# Patient Record
Sex: MALE | Race: WHITE | Employment: UNEMPLOYED | URBAN - METROPOLITAN AREA
[De-identification: names, ages, dates, MRNs, and addresses within clinical notes are randomized per-mention and may not be internally consistent; named-entity substitution may affect disease eponyms.]

---

## 2017-02-08 ENCOUNTER — TELEPHONE (OUTPATIENT)
Dept: FAMILY MEDICINE CLINIC | Age: 57
End: 2017-02-08

## 2017-02-16 LAB
ALBUMIN SERPL-MCNC: 4.1 G/DL (ref 3.2–5.3)
ALK PHOSPHATASE: 24 IU/L (ref 35–121)
ALT SERPL-CCNC: 21 IU/L (ref 5–59)
ANION GAP SERPL CALCULATED.3IONS-SCNC: 8 MMOL/L
AST SERPL-CCNC: 16 IU/L (ref 10–42)
BILIRUB SERPL-MCNC: 0.9 MG/DL (ref 0.2–1.3)
BUN BLDV-MCNC: 17 MG/DL (ref 10–20)
CALCIUM SERPL-MCNC: 9.5 MG/DL (ref 8.7–10.8)
CHLORIDE BLD-SCNC: 99 MMOL/L (ref 95–111)
CHOLESTEROL/HDL RATIO: 5
CHOLESTEROL: 171 MG/DL
CO2: 32 MMOL/L (ref 21–32)
CREAT SERPL-MCNC: 0.9 MG/DL (ref 0.5–1.3)
EGFR AFRICAN AMERICAN: 105
EGFR IF NONAFRICAN AMERICAN: 86
GLUCOSE: 151 MG/DL (ref 70–100)
HDLC SERPL-MCNC: 34 MG/DL (ref 40–60)
POTASSIUM SERPL-SCNC: 4.4 MMOL/L (ref 3.5–5.4)
SODIUM BLD-SCNC: 135 MMOL/L (ref 134–147)
TOTAL PROTEIN: 7 G/DL (ref 5.8–8)
TRIGL SERPL-MCNC: 405 MG/DL

## 2017-02-17 LAB
AVERAGE GLUCOSE: 163 MG/DL (ref 66–114)
CREATINE, URINE: 83.4 MG/DL
HBA1C MFR BLD: 7.3 % (ref 4.2–5.8)
MICROALBUMIN/CREAT 24H UR: 7 MG/DL
MICROALBUMIN/CREAT UR-RTO: 83 UG/MG
PSA, ULTRASENSITIVE: 0.36 NG/ML
TSH SERPL DL<=0.05 MIU/L-ACNC: 3.68 UIU/ML (ref 0.4–4.4)

## 2017-02-22 ENCOUNTER — OFFICE VISIT (OUTPATIENT)
Dept: FAMILY MEDICINE CLINIC | Age: 57
End: 2017-02-22

## 2017-02-22 VITALS
WEIGHT: 315 LBS | RESPIRATION RATE: 12 BRPM | BODY MASS INDEX: 42.66 KG/M2 | DIASTOLIC BLOOD PRESSURE: 86 MMHG | HEIGHT: 72 IN | SYSTOLIC BLOOD PRESSURE: 138 MMHG | OXYGEN SATURATION: 98 %

## 2017-02-22 DIAGNOSIS — E78.5 HYPERLIPIDEMIA, UNSPECIFIED HYPERLIPIDEMIA TYPE: ICD-10-CM

## 2017-02-22 DIAGNOSIS — K21.9 GASTROESOPHAGEAL REFLUX DISEASE, ESOPHAGITIS PRESENCE NOT SPECIFIED: Primary | ICD-10-CM

## 2017-02-22 DIAGNOSIS — I25.10 NON-OCCLUSIVE CORONARY ARTERY DISEASE: ICD-10-CM

## 2017-02-22 DIAGNOSIS — E66.01 MORBID OBESITY, UNSPECIFIED OBESITY TYPE (HCC): ICD-10-CM

## 2017-02-22 DIAGNOSIS — I10 ESSENTIAL HYPERTENSION: ICD-10-CM

## 2017-02-22 PROCEDURE — 99214 OFFICE O/P EST MOD 30 MIN: CPT | Performed by: FAMILY MEDICINE

## 2017-02-22 RX ORDER — CANAGLIFLOZIN 300 MG/1
TABLET, FILM COATED ORAL
Qty: 30 TABLET | Refills: 11 | Status: SHIPPED | OUTPATIENT
Start: 2017-02-22 | End: 2018-03-11 | Stop reason: SDUPTHER

## 2017-02-22 RX ORDER — PRAVASTATIN SODIUM 40 MG
TABLET ORAL
Qty: 90 TABLET | Refills: 3 | Status: SHIPPED | OUTPATIENT
Start: 2017-02-22 | End: 2017-08-24

## 2017-02-22 RX ORDER — VALSARTAN AND HYDROCHLOROTHIAZIDE 320; 12.5 MG/1; MG/1
TABLET, FILM COATED ORAL
Qty: 90 TABLET | Refills: 3 | Status: SHIPPED | OUTPATIENT
Start: 2017-02-22 | End: 2018-03-07 | Stop reason: SDUPTHER

## 2017-02-22 RX ORDER — GLIPIZIDE 10 MG/1
TABLET, FILM COATED, EXTENDED RELEASE ORAL
Qty: 90 TABLET | Refills: 3 | Status: SHIPPED | OUTPATIENT
Start: 2017-02-22 | End: 2018-02-28 | Stop reason: SDUPTHER

## 2017-02-22 RX ORDER — ASPIRIN 81 MG/1
81 TABLET ORAL DAILY
Qty: 90 TABLET | Refills: 3 | Status: SHIPPED | OUTPATIENT
Start: 2017-02-22

## 2017-02-22 RX ORDER — METOPROLOL TARTRATE 50 MG/1
50 TABLET, FILM COATED ORAL 2 TIMES DAILY
Qty: 180 TABLET | Refills: 3 | Status: SHIPPED | OUTPATIENT
Start: 2017-02-22 | End: 2017-07-20

## 2017-02-22 RX ORDER — AMLODIPINE BESYLATE 10 MG/1
TABLET ORAL
Qty: 90 TABLET | Refills: 3 | Status: SHIPPED | OUTPATIENT
Start: 2017-02-22 | End: 2018-02-11 | Stop reason: SDUPTHER

## 2017-02-22 ASSESSMENT — PATIENT HEALTH QUESTIONNAIRE - PHQ9
SUM OF ALL RESPONSES TO PHQ QUESTIONS 1-9: 0
1. LITTLE INTEREST OR PLEASURE IN DOING THINGS: 0
2. FEELING DOWN, DEPRESSED OR HOPELESS: 0
SUM OF ALL RESPONSES TO PHQ9 QUESTIONS 1 & 2: 0

## 2017-02-22 ASSESSMENT — ENCOUNTER SYMPTOMS
GASTROINTESTINAL NEGATIVE: 1
RESPIRATORY NEGATIVE: 1

## 2017-07-07 DIAGNOSIS — M25.561 ACUTE PAIN OF RIGHT KNEE: Primary | ICD-10-CM

## 2017-07-07 RX ORDER — KETOROLAC TROMETHAMINE 10 MG/1
10 TABLET, FILM COATED ORAL EVERY 6 HOURS PRN
Qty: 20 TABLET | Refills: 0 | Status: SHIPPED | OUTPATIENT
Start: 2017-07-07 | End: 2017-07-20 | Stop reason: ALTCHOICE

## 2017-07-20 ENCOUNTER — TELEPHONE (OUTPATIENT)
Dept: FAMILY MEDICINE CLINIC | Age: 57
End: 2017-07-20

## 2017-07-20 ENCOUNTER — OFFICE VISIT (OUTPATIENT)
Dept: FAMILY MEDICINE CLINIC | Age: 57
End: 2017-07-20
Payer: MEDICARE

## 2017-07-20 VITALS
HEIGHT: 71 IN | RESPIRATION RATE: 20 BRPM | DIASTOLIC BLOOD PRESSURE: 86 MMHG | SYSTOLIC BLOOD PRESSURE: 142 MMHG | HEART RATE: 88 BPM | OXYGEN SATURATION: 97 % | BODY MASS INDEX: 44.1 KG/M2 | WEIGHT: 315 LBS

## 2017-07-20 DIAGNOSIS — E78.5 HYPERLIPIDEMIA, UNSPECIFIED HYPERLIPIDEMIA TYPE: ICD-10-CM

## 2017-07-20 DIAGNOSIS — M25.461 KNEE EFFUSION, RIGHT: ICD-10-CM

## 2017-07-20 DIAGNOSIS — E66.01 MORBID OBESITY, UNSPECIFIED OBESITY TYPE (HCC): ICD-10-CM

## 2017-07-20 DIAGNOSIS — I10 ESSENTIAL HYPERTENSION: Primary | ICD-10-CM

## 2017-07-20 DIAGNOSIS — I25.10 CORONARY ARTERY DISEASE INVOLVING NATIVE CORONARY ARTERY OF NATIVE HEART WITHOUT ANGINA PECTORIS: ICD-10-CM

## 2017-07-20 PROCEDURE — 99214 OFFICE O/P EST MOD 30 MIN: CPT | Performed by: FAMILY MEDICINE

## 2017-07-20 RX ORDER — METOPROLOL TARTRATE 100 MG/1
100 TABLET ORAL 2 TIMES DAILY
Qty: 60 TABLET | Refills: 1 | Status: SHIPPED | OUTPATIENT
Start: 2017-07-20 | End: 2017-08-24 | Stop reason: SDUPTHER

## 2017-07-20 ASSESSMENT — ENCOUNTER SYMPTOMS
CHEST TIGHTNESS: 0
RESPIRATORY NEGATIVE: 1
GASTROINTESTINAL NEGATIVE: 1
SHORTNESS OF BREATH: 0

## 2017-07-24 ENCOUNTER — TELEPHONE (OUTPATIENT)
Dept: FAMILY MEDICINE CLINIC | Age: 57
End: 2017-07-24

## 2017-08-15 ENCOUNTER — TELEPHONE (OUTPATIENT)
Dept: FAMILY MEDICINE CLINIC | Age: 57
End: 2017-08-15

## 2017-08-17 LAB
AVERAGE GLUCOSE: 143 MG/DL (ref 66–114)
HBA1C MFR BLD: 6.6 % (ref 4.2–5.8)

## 2017-08-19 LAB — LDL CHOLESTEROL DIRECT: 149 MG/DL

## 2017-08-24 ENCOUNTER — OFFICE VISIT (OUTPATIENT)
Dept: FAMILY MEDICINE CLINIC | Age: 57
End: 2017-08-24
Payer: MEDICARE

## 2017-08-24 VITALS
RESPIRATION RATE: 14 BRPM | SYSTOLIC BLOOD PRESSURE: 130 MMHG | HEIGHT: 70 IN | DIASTOLIC BLOOD PRESSURE: 80 MMHG | OXYGEN SATURATION: 96 % | WEIGHT: 315 LBS | BODY MASS INDEX: 45.1 KG/M2 | HEART RATE: 70 BPM

## 2017-08-24 DIAGNOSIS — Z11.59 ENCOUNTER FOR HEPATITIS C SCREENING TEST FOR LOW RISK PATIENT: ICD-10-CM

## 2017-08-24 DIAGNOSIS — Z11.4 SCREENING FOR HIV (HUMAN IMMUNODEFICIENCY VIRUS): ICD-10-CM

## 2017-08-24 DIAGNOSIS — I25.10 NON-OCCLUSIVE CORONARY ARTERY DISEASE: ICD-10-CM

## 2017-08-24 DIAGNOSIS — Z12.5 SCREENING FOR PROSTATE CANCER: ICD-10-CM

## 2017-08-24 DIAGNOSIS — I10 ESSENTIAL HYPERTENSION: ICD-10-CM

## 2017-08-24 DIAGNOSIS — E66.01 MORBID OBESITY, UNSPECIFIED OBESITY TYPE (HCC): ICD-10-CM

## 2017-08-24 DIAGNOSIS — E11.9 CONTROLLED TYPE 2 DIABETES MELLITUS WITHOUT COMPLICATION, WITHOUT LONG-TERM CURRENT USE OF INSULIN (HCC): ICD-10-CM

## 2017-08-24 DIAGNOSIS — E78.5 HYPERLIPIDEMIA, UNSPECIFIED HYPERLIPIDEMIA TYPE: Primary | ICD-10-CM

## 2017-08-24 DIAGNOSIS — K21.9 GASTROESOPHAGEAL REFLUX DISEASE, ESOPHAGITIS PRESENCE NOT SPECIFIED: ICD-10-CM

## 2017-08-24 DIAGNOSIS — Z23 NEED FOR 23-POLYVALENT PNEUMOCOCCAL POLYSACCHARIDE VACCINE: ICD-10-CM

## 2017-08-24 PROCEDURE — 90732 PPSV23 VACC 2 YRS+ SUBQ/IM: CPT | Performed by: FAMILY MEDICINE

## 2017-08-24 PROCEDURE — 90471 IMMUNIZATION ADMIN: CPT | Performed by: FAMILY MEDICINE

## 2017-08-24 PROCEDURE — 99214 OFFICE O/P EST MOD 30 MIN: CPT | Performed by: FAMILY MEDICINE

## 2017-08-24 RX ORDER — PRAVASTATIN SODIUM 80 MG/1
80 TABLET ORAL DAILY
Qty: 90 TABLET | Refills: 3 | Status: SHIPPED | OUTPATIENT
Start: 2017-08-24 | End: 2018-07-25 | Stop reason: SDUPTHER

## 2017-08-24 RX ORDER — METOPROLOL TARTRATE 100 MG/1
100 TABLET ORAL 2 TIMES DAILY
Qty: 60 TABLET | Refills: 11 | Status: SHIPPED | OUTPATIENT
Start: 2017-08-24 | End: 2018-07-26 | Stop reason: ALTCHOICE

## 2017-08-24 ASSESSMENT — ENCOUNTER SYMPTOMS
RESPIRATORY NEGATIVE: 1
GASTROINTESTINAL NEGATIVE: 1

## 2017-09-07 ENCOUNTER — OFFICE VISIT (OUTPATIENT)
Dept: CARDIOLOGY CLINIC | Age: 57
End: 2017-09-07
Payer: MEDICARE

## 2017-09-07 VITALS
HEIGHT: 72 IN | BODY MASS INDEX: 42.66 KG/M2 | DIASTOLIC BLOOD PRESSURE: 80 MMHG | HEART RATE: 75 BPM | WEIGHT: 315 LBS | SYSTOLIC BLOOD PRESSURE: 132 MMHG

## 2017-09-07 DIAGNOSIS — E11.8 TYPE 2 DIABETES MELLITUS WITH COMPLICATION, UNSPECIFIED LONG TERM INSULIN USE STATUS: ICD-10-CM

## 2017-09-07 DIAGNOSIS — Z91.89 CARDIOVASCULAR RISK FACTOR: ICD-10-CM

## 2017-09-07 DIAGNOSIS — R00.2 HEART PALPITATIONS: ICD-10-CM

## 2017-09-07 DIAGNOSIS — E78.5 DYSLIPIDEMIA, GOAL LDL BELOW 100: ICD-10-CM

## 2017-09-07 DIAGNOSIS — R94.31 ABNORMAL ELECTROCARDIOGRAM (ECG) (EKG): Primary | ICD-10-CM

## 2017-09-07 DIAGNOSIS — I10 HYPERTENSION GOAL BP (BLOOD PRESSURE) < 130/80: ICD-10-CM

## 2017-09-07 PROCEDURE — 99203 OFFICE O/P NEW LOW 30 MIN: CPT | Performed by: INTERNAL MEDICINE

## 2017-09-07 PROCEDURE — 93000 ELECTROCARDIOGRAM COMPLETE: CPT | Performed by: INTERNAL MEDICINE

## 2017-09-07 RX ORDER — NITROGLYCERIN 0.4 MG/1
0.4 TABLET SUBLINGUAL EVERY 5 MIN PRN
COMMUNITY
End: 2019-06-27 | Stop reason: ALTCHOICE

## 2017-09-21 ENCOUNTER — HOSPITAL ENCOUNTER (OUTPATIENT)
Dept: NON INVASIVE DIAGNOSTICS | Age: 57
Discharge: HOME | End: 2017-09-21
Payer: MEDICARE

## 2017-09-21 DIAGNOSIS — R94.31 ABNORMAL ELECTROCARDIOGRAM (ECG) (EKG): ICD-10-CM

## 2017-09-21 LAB
LV EF: 48 %
LVEF MODALITY: NORMAL

## 2017-09-21 PROCEDURE — 93017 CV STRESS TEST TRACING ONLY: CPT | Performed by: INTERNAL MEDICINE

## 2017-09-21 PROCEDURE — 78452 HT MUSCLE IMAGE SPECT MULT: CPT

## 2017-09-21 PROCEDURE — 6360000002 HC RX W HCPCS

## 2017-09-21 PROCEDURE — 93306 TTE W/DOPPLER COMPLETE: CPT

## 2017-09-21 PROCEDURE — 3430000000 HC RX DIAGNOSTIC RADIOPHARMACEUTICAL: Performed by: INTERNAL MEDICINE

## 2017-09-21 PROCEDURE — A9500 TC99M SESTAMIBI: HCPCS | Performed by: INTERNAL MEDICINE

## 2017-09-21 RX ADMIN — Medication 9.4 MILLICURIE: at 08:35

## 2017-09-21 RX ADMIN — Medication 31.6 MILLICURIE: at 09:40

## 2017-09-28 ENCOUNTER — OFFICE VISIT (OUTPATIENT)
Dept: CARDIOLOGY CLINIC | Age: 57
End: 2017-09-28
Payer: MEDICARE

## 2017-09-28 VITALS
WEIGHT: 315 LBS | HEIGHT: 72 IN | HEART RATE: 68 BPM | DIASTOLIC BLOOD PRESSURE: 80 MMHG | BODY MASS INDEX: 42.66 KG/M2 | SYSTOLIC BLOOD PRESSURE: 130 MMHG

## 2017-09-28 DIAGNOSIS — E11.8 TYPE 2 DIABETES MELLITUS WITH COMPLICATION, UNSPECIFIED LONG TERM INSULIN USE STATUS: ICD-10-CM

## 2017-09-28 DIAGNOSIS — E78.5 DYSLIPIDEMIA, GOAL LDL BELOW 100: ICD-10-CM

## 2017-09-28 DIAGNOSIS — I10 HYPERTENSION GOAL BP (BLOOD PRESSURE) < 130/80: Primary | ICD-10-CM

## 2017-09-28 PROCEDURE — 99213 OFFICE O/P EST LOW 20 MIN: CPT | Performed by: INTERNAL MEDICINE

## 2017-10-03 ENCOUNTER — TELEPHONE (OUTPATIENT)
Dept: CARDIOLOGY CLINIC | Age: 57
End: 2017-10-03

## 2017-10-20 RX ORDER — GLIPIZIDE 10 MG/1
TABLET, FILM COATED, EXTENDED RELEASE ORAL
Qty: 90 TABLET | Refills: 3 | Status: SHIPPED | OUTPATIENT
Start: 2017-10-20 | End: 2018-08-29 | Stop reason: SDUPTHER

## 2017-10-25 ENCOUNTER — TELEPHONE (OUTPATIENT)
Dept: FAMILY MEDICINE CLINIC | Age: 57
End: 2017-10-25

## 2018-02-11 DIAGNOSIS — I10 ESSENTIAL HYPERTENSION: ICD-10-CM

## 2018-02-12 ENCOUNTER — TELEPHONE (OUTPATIENT)
Dept: FAMILY MEDICINE CLINIC | Age: 58
End: 2018-02-12

## 2018-02-12 RX ORDER — AMLODIPINE BESYLATE 10 MG/1
TABLET ORAL
Qty: 90 TABLET | Refills: 3 | Status: SHIPPED | OUTPATIENT
Start: 2018-02-12 | End: 2019-01-25 | Stop reason: SDUPTHER

## 2018-02-16 LAB
ALBUMIN SERPL-MCNC: 4.6 G/DL (ref 3.5–5.2)
ALK PHOSPHATASE: 30 U/L (ref 39–118)
ALT SERPL-CCNC: 21 U/L (ref 5–50)
ANION GAP SERPL CALCULATED.3IONS-SCNC: 15 MEQ/L (ref 10–19)
AST SERPL-CCNC: 15 U/L (ref 9–50)
AVERAGE GLUCOSE: 169 MG/DL (ref 66–114)
BILIRUB SERPL-MCNC: 0.8 MG/DL
BUN BLDV-MCNC: 19 MG/DL (ref 8–23)
CALCIUM SERPL-MCNC: 10 MG/DL (ref 8.5–10.5)
CHLORIDE BLD-SCNC: 95 MEQ/L (ref 95–107)
CHOLESTEROL/HDL RATIO: 4.3
CHOLESTEROL: 159 MG/DL
CO2: 28 MEQ/L (ref 19–31)
CREAT SERPL-MCNC: 0.9 MG/DL (ref 0.8–1.4)
CREATINE, URINE: 70.2 MG/DL (ref 39–259)
EGFR AFRICAN AMERICAN: 107.2 ML/MIN/1.73 M2
EGFR IF NONAFRICAN AMERICAN: 92.5 ML/MIN/1.73 M2
GLUCOSE: 177 MG/DL (ref 70–99)
HBA1C MFR BLD: 7.5 % (ref 4.2–5.8)
HDLC SERPL-MCNC: 37 MG/DL
HEPATITIS C ANTIBODY: NEGATIVE
HIV 1,2 COMBO ANTIGEN/ANTIBODY: NORMAL
LDL CHOLESTEROL CALCULATED: 66 MG/DL
LDL/HDL RATIO: 1.8
MICROALBUMIN/CREAT 24H UR: <12 MG/DL
MICROALBUMIN/CREAT UR-RTO: NORMAL MG/G
POTASSIUM SERPL-SCNC: 4.8 MEQ/L (ref 3.5–5.4)
PSA, ULTRASENSITIVE: 0.37 NG/ML
SODIUM BLD-SCNC: 138 MEQ/L (ref 135–146)
TOTAL PROTEIN: 7.9 G/DL (ref 6.1–8.3)
TRIGL SERPL-MCNC: 281 MG/DL
VLDLC SERPL CALC-MCNC: 56 MG/DL

## 2018-02-28 ENCOUNTER — OFFICE VISIT (OUTPATIENT)
Dept: FAMILY MEDICINE CLINIC | Age: 58
End: 2018-02-28
Payer: MEDICARE

## 2018-02-28 VITALS
HEIGHT: 72 IN | DIASTOLIC BLOOD PRESSURE: 84 MMHG | SYSTOLIC BLOOD PRESSURE: 138 MMHG | BODY MASS INDEX: 42.66 KG/M2 | RESPIRATION RATE: 16 BRPM | HEART RATE: 84 BPM | WEIGHT: 315 LBS

## 2018-02-28 DIAGNOSIS — I10 ESSENTIAL HYPERTENSION: ICD-10-CM

## 2018-02-28 DIAGNOSIS — I25.10 NON-OCCLUSIVE CORONARY ARTERY DISEASE: ICD-10-CM

## 2018-02-28 DIAGNOSIS — E66.01 MORBID OBESITY (HCC): ICD-10-CM

## 2018-02-28 DIAGNOSIS — E78.49 OTHER HYPERLIPIDEMIA: ICD-10-CM

## 2018-02-28 DIAGNOSIS — E11.49 OTHER DIABETIC NEUROLOGICAL COMPLICATION ASSOCIATED WITH TYPE 2 DIABETES MELLITUS (HCC): ICD-10-CM

## 2018-02-28 DIAGNOSIS — K21.9 GASTROESOPHAGEAL REFLUX DISEASE, ESOPHAGITIS PRESENCE NOT SPECIFIED: ICD-10-CM

## 2018-02-28 PROBLEM — E11.40 DIABETIC NEUROPATHY (HCC): Status: ACTIVE | Noted: 2018-02-28

## 2018-02-28 PROCEDURE — 3017F COLORECTAL CA SCREEN DOC REV: CPT | Performed by: FAMILY MEDICINE

## 2018-02-28 PROCEDURE — G8484 FLU IMMUNIZE NO ADMIN: HCPCS | Performed by: FAMILY MEDICINE

## 2018-02-28 PROCEDURE — 99214 OFFICE O/P EST MOD 30 MIN: CPT | Performed by: FAMILY MEDICINE

## 2018-02-28 PROCEDURE — 3045F PR MOST RECENT HEMOGLOBIN A1C LEVEL 7.0-9.0%: CPT | Performed by: FAMILY MEDICINE

## 2018-02-28 PROCEDURE — 1036F TOBACCO NON-USER: CPT | Performed by: FAMILY MEDICINE

## 2018-02-28 PROCEDURE — G8598 ASA/ANTIPLAT THER USED: HCPCS | Performed by: FAMILY MEDICINE

## 2018-02-28 PROCEDURE — G8417 CALC BMI ABV UP PARAM F/U: HCPCS | Performed by: FAMILY MEDICINE

## 2018-02-28 PROCEDURE — G8427 DOCREV CUR MEDS BY ELIG CLIN: HCPCS | Performed by: FAMILY MEDICINE

## 2018-02-28 RX ORDER — DULOXETIN HYDROCHLORIDE 30 MG/1
30 CAPSULE, DELAYED RELEASE ORAL DAILY
Qty: 90 CAPSULE | Refills: 3 | Status: SHIPPED | OUTPATIENT
Start: 2018-02-28 | End: 2018-07-26 | Stop reason: ALTCHOICE

## 2018-02-28 ASSESSMENT — ENCOUNTER SYMPTOMS
GASTROINTESTINAL NEGATIVE: 1
RESPIRATORY NEGATIVE: 1

## 2018-02-28 ASSESSMENT — PATIENT HEALTH QUESTIONNAIRE - PHQ9
SUM OF ALL RESPONSES TO PHQ QUESTIONS 1-9: 0
SUM OF ALL RESPONSES TO PHQ9 QUESTIONS 1 & 2: 0
1. LITTLE INTEREST OR PLEASURE IN DOING THINGS: 0
2. FEELING DOWN, DEPRESSED OR HOPELESS: 0

## 2018-02-28 NOTE — PROGRESS NOTES
02/15/2018 0848    ALKPHOS 30 (L) 02/15/2018 0848    AST 15 02/15/2018 0848    ALT 21 02/15/2018 0848    BILITOT 0.8 02/15/2018 0848            Lab Results   Component Value Date    TSH 3.680 02/16/2017       Lab Results   Component Value Date    WBC 8.3 10/23/2012    HGB 15.8 10/23/2012    HCT 46.3 10/23/2012    MCV 93.5 10/23/2012     10/23/2012         Health Maintenance   Topic Date Due    Shingles Vaccine (1 of 2 - 2 Dose Series) 01/02/2008    Colon cancer screen colonoscopy  01/02/2008    Diabetic retinal exam  03/18/2016    Flu vaccine (1) 09/01/2017    Diabetic foot exam  07/20/2018    A1C test (Diabetic or Prediabetic)  02/15/2019    Diabetic microalbuminuria test  02/15/2019    Lipid screen  02/15/2019    DTaP/Tdap/Td vaccine (2 - Tdap) 03/18/2025    Pneumococcal med risk  Completed    Hepatitis C screen  Completed    HIV screen  Completed       Immunization History   Administered Date(s) Administered    DTaP 03/18/2015    Pneumococcal Polysaccharide ([id number]) 08/24/2017    Tdap (Boostrix, Adacel) 03/18/2015         Review of Systems   Constitutional: Negative. HENT: Negative. Respiratory: Negative. Cardiovascular: Negative. Gastrointestinal: Negative. Musculoskeletal: Negative. Neurological: Positive for numbness (burninng both feet). All other systems reviewed and are negative. Objective:   Physical Exam   Constitutional: He is oriented to person, place, and time. He appears well-developed and well-nourished. HENT:   Head: Normocephalic and atraumatic. Right Ear: Tympanic membrane normal.   Left Ear: Tympanic membrane normal.   Mouth/Throat: Oropharynx is clear and moist and mucous membranes are normal.   Neck: Carotid bruit is not present. Cardiovascular: Normal rate, regular rhythm and normal heart sounds. No murmur heard. Pulmonary/Chest: Effort normal and breath sounds normal.   Abdominal: Soft.  Bowel sounds are normal.   Musculoskeletal:

## 2018-03-07 ENCOUNTER — TELEPHONE (OUTPATIENT)
Dept: FAMILY MEDICINE CLINIC | Age: 58
End: 2018-03-07

## 2018-03-07 DIAGNOSIS — I10 ESSENTIAL HYPERTENSION: ICD-10-CM

## 2018-03-07 RX ORDER — NYSTATIN 100000 U/G
CREAM TOPICAL
Qty: 30 G | Refills: 1 | Status: SHIPPED | OUTPATIENT
Start: 2018-03-07 | End: 2018-10-10 | Stop reason: SDUPTHER

## 2018-03-07 RX ORDER — VALSARTAN AND HYDROCHLOROTHIAZIDE 320; 12.5 MG/1; MG/1
TABLET, FILM COATED ORAL
Qty: 90 TABLET | Refills: 3 | Status: SHIPPED | OUTPATIENT
Start: 2018-03-07 | End: 2019-05-02 | Stop reason: SDUPTHER

## 2018-03-07 RX ORDER — METOPROLOL TARTRATE 50 MG/1
TABLET, FILM COATED ORAL
Qty: 180 TABLET | Refills: 3 | Status: SHIPPED | OUTPATIENT
Start: 2018-03-07 | End: 2019-02-04 | Stop reason: SDUPTHER

## 2018-03-07 NOTE — TELEPHONE ENCOUNTER
Patient's wife Sushant Lee (on [employer]IP[employer]) left message on our vm asking for refill of nystatin. Said patient is having same issues as before and is out of medication. Will check [facility] on [address] if no call back.

## 2018-03-12 RX ORDER — CANAGLIFLOZIN 300 MG/1
TABLET, FILM COATED ORAL
Qty: 30 TABLET | Refills: 11 | Status: SHIPPED | OUTPATIENT
Start: 2018-03-12 | End: 2019-03-05 | Stop reason: SDUPTHER

## 2018-07-25 DIAGNOSIS — E78.5 HYPERLIPIDEMIA, UNSPECIFIED HYPERLIPIDEMIA TYPE: ICD-10-CM

## 2018-07-25 RX ORDER — PRAVASTATIN SODIUM 80 MG/1
TABLET ORAL
Qty: 90 TABLET | Refills: 3 | Status: SHIPPED | OUTPATIENT
Start: 2018-07-25 | End: 2019-05-29 | Stop reason: SDUPTHER

## 2018-07-26 ENCOUNTER — OFFICE VISIT (OUTPATIENT)
Dept: CARDIOLOGY CLINIC | Age: 58
End: 2018-07-26
Payer: MEDICARE

## 2018-07-26 VITALS
HEIGHT: 72 IN | WEIGHT: 315 LBS | HEART RATE: 72 BPM | BODY MASS INDEX: 42.66 KG/M2 | SYSTOLIC BLOOD PRESSURE: 126 MMHG | DIASTOLIC BLOOD PRESSURE: 78 MMHG

## 2018-07-26 DIAGNOSIS — E78.5 DYSLIPIDEMIA, GOAL LDL BELOW 100: ICD-10-CM

## 2018-07-26 DIAGNOSIS — I10 HYPERTENSION GOAL BP (BLOOD PRESSURE) < 130/80: Primary | ICD-10-CM

## 2018-07-26 DIAGNOSIS — Z91.89 CARDIOVASCULAR RISK FACTOR: ICD-10-CM

## 2018-07-26 PROCEDURE — G8598 ASA/ANTIPLAT THER USED: HCPCS | Performed by: INTERNAL MEDICINE

## 2018-07-26 PROCEDURE — 1036F TOBACCO NON-USER: CPT | Performed by: INTERNAL MEDICINE

## 2018-07-26 PROCEDURE — 3017F COLORECTAL CA SCREEN DOC REV: CPT | Performed by: INTERNAL MEDICINE

## 2018-07-26 PROCEDURE — G8417 CALC BMI ABV UP PARAM F/U: HCPCS | Performed by: INTERNAL MEDICINE

## 2018-07-26 PROCEDURE — 99212 OFFICE O/P EST SF 10 MIN: CPT | Performed by: INTERNAL MEDICINE

## 2018-07-26 PROCEDURE — G8427 DOCREV CUR MEDS BY ELIG CLIN: HCPCS | Performed by: INTERNAL MEDICINE

## 2018-07-26 NOTE — PROGRESS NOTES
This patient is followed regularly by Dr. Alyson Urrutia DO. Chief Complaint   Patient presents with    Check-Up     9 month follow-up    Hypertension     Denies chest pain and shortness of breath. Current Outpatient Prescriptions   Medication Sig Dispense Refill    pravastatin (PRAVACHOL) 80 MG tablet take 1 tablet by mouth once daily 90 tablet 3    INVOKANA 300 MG TABS tablet take 1 tablet by mouth every morning 30 tablet 11    metoprolol tartrate (LOPRESSOR) 50 MG tablet take 1 tablet by mouth twice a day 180 tablet 3    valsartan-hydrochlorothiazide (DIOVAN-HCT) 320-12.5 MG per tablet take 1 tablet by mouth once daily 90 tablet 3    nystatin (MYCOSTATIN) 426329 UNIT/GM cream Apply topically 2 times daily. 30 g 1    amLODIPine (NORVASC) 10 MG tablet take 1 tablet by mouth once daily 90 tablet 3    metFORMIN (GLUCOPHAGE) 1000 MG tablet take 1 tablet by mouth twice a day with meals 180 tablet 3    glipiZIDE (GLUCOTROL XL) 10 MG extended release tablet take 1 tablet by mouth once daily 90 tablet 3    nitroGLYCERIN (NITROSTAT) 0.4 MG SL tablet Place 0.4 mg under the tongue every 5 minutes as needed for Chest pain up to max of 3 total doses. If no relief after 1 dose, call 911.  VENTOLIN  (90 Base) MCG/ACT inhaler Inhale 2 puffs into the lungs every 4 hours as needed for Wheezing or Shortness of Breath 1 Inhaler 0    aspirin 81 MG EC tablet Take 1 tablet by mouth daily 90 tablet 3    FISH OIL Take  by mouth daily.  Multiple Vitamins-Minerals (CENTRUM PO) Take 1 tablet by mouth daily. No current facility-administered medications for this visit. Review of Systems - General ROS: negative  Psychological ROS: negative  Hematological and Lymphatic ROS: No history of blood clots or bleeding disorder.    Respiratory ROS: no cough, shortness of breath, or wheezing  Cardiovascular ROS: no chest pain or dyspnea on exertion, no palpitations  Gastrointestinal ROS: family doctor. The therapeutic values that need to be met are also presented to the patient and need to achieve them is emphasized and patient agrees and accepts. RTC 1 year.

## 2018-07-26 NOTE — PROGRESS NOTES
9 month follow-up. He denies having any chest pain, dizziness, lightheadedness or FLOR. He has SOB with exertion.

## 2018-08-20 ENCOUNTER — TELEPHONE (OUTPATIENT)
Dept: FAMILY MEDICINE CLINIC | Age: 58
End: 2018-08-20

## 2018-08-21 DIAGNOSIS — I10 ESSENTIAL HYPERTENSION: ICD-10-CM

## 2018-08-21 RX ORDER — METOPROLOL TARTRATE 100 MG/1
TABLET ORAL
Qty: 60 TABLET | Refills: 11 | Status: SHIPPED | OUTPATIENT
Start: 2018-08-21 | End: 2018-12-28 | Stop reason: DRUGHIGH

## 2018-08-23 LAB
AVERAGE GLUCOSE: 186 MG/DL (ref 66–114)
HBA1C MFR BLD: 8.1 % (ref 4.2–5.8)

## 2018-08-29 ENCOUNTER — OFFICE VISIT (OUTPATIENT)
Dept: FAMILY MEDICINE CLINIC | Age: 58
End: 2018-08-29
Payer: MEDICARE

## 2018-08-29 ENCOUNTER — TELEPHONE (OUTPATIENT)
Dept: FAMILY MEDICINE CLINIC | Age: 58
End: 2018-08-29

## 2018-08-29 VITALS
HEIGHT: 72 IN | RESPIRATION RATE: 20 BRPM | DIASTOLIC BLOOD PRESSURE: 76 MMHG | SYSTOLIC BLOOD PRESSURE: 136 MMHG | WEIGHT: 315 LBS | HEART RATE: 80 BPM | BODY MASS INDEX: 42.66 KG/M2

## 2018-08-29 DIAGNOSIS — I10 ESSENTIAL HYPERTENSION: ICD-10-CM

## 2018-08-29 DIAGNOSIS — I87.2 VENOUS STASIS DERMATITIS OF LEFT LOWER EXTREMITY: Primary | ICD-10-CM

## 2018-08-29 DIAGNOSIS — R60.0 LOWER LEG EDEMA: ICD-10-CM

## 2018-08-29 DIAGNOSIS — K21.9 GASTROESOPHAGEAL REFLUX DISEASE, ESOPHAGITIS PRESENCE NOT SPECIFIED: ICD-10-CM

## 2018-08-29 DIAGNOSIS — F51.01 PRIMARY INSOMNIA: ICD-10-CM

## 2018-08-29 DIAGNOSIS — E66.01 MORBID OBESITY (HCC): ICD-10-CM

## 2018-08-29 DIAGNOSIS — E78.49 OTHER HYPERLIPIDEMIA: ICD-10-CM

## 2018-08-29 PROCEDURE — G8427 DOCREV CUR MEDS BY ELIG CLIN: HCPCS | Performed by: FAMILY MEDICINE

## 2018-08-29 PROCEDURE — 3045F PR MOST RECENT HEMOGLOBIN A1C LEVEL 7.0-9.0%: CPT | Performed by: FAMILY MEDICINE

## 2018-08-29 PROCEDURE — 2022F DILAT RTA XM EVC RTNOPTHY: CPT | Performed by: FAMILY MEDICINE

## 2018-08-29 PROCEDURE — G8417 CALC BMI ABV UP PARAM F/U: HCPCS | Performed by: FAMILY MEDICINE

## 2018-08-29 PROCEDURE — 99214 OFFICE O/P EST MOD 30 MIN: CPT | Performed by: FAMILY MEDICINE

## 2018-08-29 PROCEDURE — 3017F COLORECTAL CA SCREEN DOC REV: CPT | Performed by: FAMILY MEDICINE

## 2018-08-29 PROCEDURE — 1036F TOBACCO NON-USER: CPT | Performed by: FAMILY MEDICINE

## 2018-08-29 PROCEDURE — G8598 ASA/ANTIPLAT THER USED: HCPCS | Performed by: FAMILY MEDICINE

## 2018-08-29 RX ORDER — GLIPIZIDE 10 MG/1
TABLET, FILM COATED, EXTENDED RELEASE ORAL
Qty: 90 TABLET | Refills: 3 | Status: SHIPPED | OUTPATIENT
Start: 2018-08-29 | End: 2019-07-31 | Stop reason: SDUPTHER

## 2018-08-29 RX ORDER — DULOXETIN HYDROCHLORIDE 30 MG/1
30 CAPSULE, DELAYED RELEASE ORAL DAILY
COMMUNITY
End: 2019-12-16 | Stop reason: SDUPTHER

## 2018-08-29 ASSESSMENT — ENCOUNTER SYMPTOMS
GASTROINTESTINAL NEGATIVE: 1
RESPIRATORY NEGATIVE: 1

## 2018-08-29 NOTE — PROGRESS NOTES
Subjective:      Patient ID: Raciel Abel is a 59 y.o. male. HPI:    Chief Complaint   Patient presents with    6 Month Follow-Up    Diabetes    Hypertension     ? which does of Metoprolol he is to be taking    Results    Rash     left leg    Hand Problem     left hand sore keeps opening up    Insomnia     6 month eval.      BP well controlled. BP Readings from Last 3 Encounters:   08/29/18 136/76   07/26/18 126/78   02/28/18 138/84     Sugars not well controlled. Continues to climb, 6.6 to 7.5 to 8.1. Lab Results   Component Value Date    LABA1C 8.1 (H) 08/22/2018     Weight stable. Wt Readings from Last 3 Encounters:   08/29/18 (!) 324 lb 8 oz (147.2 kg)   07/26/18 (!) 325 lb (147.4 kg)   02/28/18 (!) 324 lb (147 kg)     Pt c/o rash to left leg for 2 weeks. Very itchy. No swelling. Only on the left lower leg. Has been using steroids with no relief. Not sleeping well, this is not new for him. De does snore. Pt also c/o left hand soreness and swelling. Picks at them. Patient Active Problem List   Diagnosis    HTN (hypertension)    Hyperlipidemia    GERD (gastroesophageal reflux disease)    ED (erectile dysfunction)    Morbid obesity ([address]    Non-occlusive coronary artery disease    Diabetes mellitus type II, controlled ([address]    Uncontrolled diabetes mellitus type 2 without complications ([address]    Controlled type 2 diabetes mellitus without complication, without long-term current use of insulin ([address]    Diabetic neuropathy ([address]     Past Surgical History:   Procedure Laterality Date    CARDIAC CATHETERIZATION      KNEE SURGERY Right 12/2017    VARICOSE VEIN SURGERY       Prior to Admission medications    Medication Sig Start Date End Date Taking?  Authorizing Provider   DULoxetine (CYMBALTA) 30 MG extended release capsule Take 30 mg by mouth daily   Yes Historical Provider, MD   pravastatin (PRAVACHOL) 80 MG tablet take 1 tablet by mouth once daily 7/25/18  Yes Orvel Hikes, DO   INVOKANA 300 MG TABS tablet take 1 tablet by mouth every morning 3/12/18  Yes Orvel Hikes, DO   metoprolol tartrate (LOPRESSOR) 50 MG tablet take 1 tablet by mouth twice a day 3/7/18  Yes Orvel Hikes, DO   valsartan-hydrochlorothiazide (DIOVAN-HCT) 320-12.5 MG per tablet take 1 tablet by mouth once daily 3/7/18  Yes Orvel Hikes, DO   amLODIPine (NORVASC) 10 MG tablet take 1 tablet by mouth once daily 2/12/18  Yes Orvel Hikes, DO   metFORMIN (GLUCOPHAGE) 1000 MG tablet take 1 tablet by mouth twice a day with meals 12/28/17  Yes Orvel Hikes, DO   glipiZIDE (GLUCOTROL XL) 10 MG extended release tablet take 1 tablet by mouth once daily 10/20/17  Yes Orvel Hikes, DO   VENTOLIN  (46 Base) MCG/ACT inhaler Inhale 2 puffs into the lungs every 4 hours as needed for Wheezing or Shortness of Breath 8/24/17  Yes Orvel Hikes, DO   aspirin 81 MG EC tablet Take 1 tablet by mouth daily 2/22/17  Yes Orvel Hikes, DO   FISH OIL Take  by mouth daily. Yes Historical Provider, MD   Multiple Vitamins-Minerals (CENTRUM PO) Take 1 tablet by mouth daily. Yes Historical Provider, MD   metoprolol (LOPRESSOR) 100 MG tablet take 1 tablet by mouth twice a day 8/21/18   Orvel Hikes, DO   nystatin (MYCOSTATIN) 846255 UNIT/GM cream Apply topically 2 times daily. 3/7/18   Orvel Hikes, DO   nitroGLYCERIN (NITROSTAT) 0.4 MG SL tablet Place 0.4 mg under the tongue every 5 minutes as needed for Chest pain up to max of 3 total doses. If no relief after 1 dose, call 911.     Historical Provider, MD       Lab Results   Component Value Date    LABA1C 8.1 (H) 08/22/2018     No results found for: EAG    No components found for: CHLPL  Lab Results   Component Value Date    TRIG 281 (H) 02/15/2018    TRIG 405 (H) 02/16/2017    TRIG 298 (H) 06/25/2015     Lab Results   Component Value Date    HDL 37 (L) 02/15/2018    HDL 34 (L) 02/16/2017    HDL 31 systems reviewed and are negative. Objective:   Physical Exam   Constitutional: He is oriented to person, place, and time. He appears well-developed and well-nourished. HENT:   Head: Normocephalic and atraumatic. Right Ear: Tympanic membrane normal.   Left Ear: Tympanic membrane normal.   Mouth/Throat: Oropharynx is clear and moist and mucous membranes are normal.   Neck: Carotid bruit is not present. Cardiovascular: Normal rate, regular rhythm and normal heart sounds. No murmur heard. Pulmonary/Chest: Effort normal and breath sounds normal.   Abdominal: Soft. Bowel sounds are normal.   Musculoskeletal: He exhibits no edema. Neurological: He is alert and oriented to person, place, and time. Skin: Skin is warm and dry. Psychiatric: He has a normal mood and affect. His behavior is normal.   Nursing note and vitals reviewed. Assessment:       Diagnosis Orders   1. Venous stasis dermatitis of left lower extremity     2. Lower leg edema     3. Primary insomnia     4. Uncontrolled type 2 diabetes mellitus without complication, without long-term current use of insulin ([facility])  glipiZIDE (GLUCOTROL XL) 10 MG extended release tablet     DIABETES FOOT EXAM    Dulaglutide (TRULICITY) 7.19 NC/5.3CD SOPN    Insulin Pen Needle 31G X 5 MM MISC    Hemoglobin A1C   5. Morbid obesity ([address]     6. Essential hypertension     7. Other hyperlipidemia     8.  Gastroesophageal reflux disease, esophagitis presence not specified             Plan:      -  Chronic medical problems stable  -  Continue current medications  -  Follow up with specialists as scheduled  -  A1C continues to climb, must change diet and lose weight   -  Will add Trulicity weekly  -  Recheck A1C 3 mos  -  RTO 3 mos        Cuauhtemoc Evans DO

## 2018-08-29 NOTE — PATIENT INSTRUCTIONS
You may receive a survey about your visit with us today. The feedback from our patients helps us identify what is working well and where the service to all patients can be enhanced. Thank you! Patient Education        Learning About Diabetes Food Guidelines  Your Care Instructions    Meal planning is important to manage diabetes. It helps keep your blood sugar at a target level (which you set with your doctor). You don't have to eat special foods. You can eat what your family eats, including sweets once in a while. But you do have to pay attention to how often you eat and how much you eat of certain foods. You may want to work with a dietitian or a certified diabetes educator (CDE) to help you plan meals and snacks. A dietitian or CDE can also help you lose weight if that is one of your goals. What should you know about eating carbs? Managing the amount of carbohydrate (carbs) you eat is an important part of healthy meals when you have diabetes. Carbohydrate is found in many foods. · Learn which foods have carbs. And learn the amounts of carbs in different foods. ¨ Bread, cereal, pasta, and rice have about 15 grams of carbs in a serving. A serving is 1 slice of bread (1 ounce), ½ cup of cooked cereal, or 1/3 cup of cooked pasta or rice. ¨ Fruits have 15 grams of carbs in a serving. A serving is 1 small fresh fruit, such as an apple or orange; ½ of a banana; ½ cup of cooked or canned fruit; ½ cup of fruit juice; 1 cup of melon or raspberries; or 2 tablespoons of dried fruit. ¨ Milk and no-sugar-added yogurt have 15 grams of carbs in a serving. A serving is 1 cup of milk or 2/3 cup of no-sugar-added yogurt. ¨ Starchy vegetables have 15 grams of carbs in a serving. A serving is ½ cup of mashed potatoes or sweet potato; 1 cup winter squash; ½ of a small baked potato; ½ cup of cooked beans; or ½ cup cooked corn or green peas.   · Learn how much carbs to eat each day and at each meal. A dietitian or CDE can teach you how to keep track of the amount of carbs you eat. This is called carbohydrate counting. · If you are not sure how to count carbohydrate grams, use the Plate Method to plan meals. It is a good, quick way to make sure that you have a balanced meal. It also helps you spread carbs throughout the day. ¨ Divide your plate by types of foods. Put non-starchy vegetables on half the plate, meat or other protein food on one-quarter of the plate, and a grain or starchy vegetable in the final quarter of the plate. To this you can add a small piece of fruit and 1 cup of milk or yogurt, depending on how many carbs you are supposed to eat at a meal.  · Try to eat about the same amount of carbs at each meal. Do not \"save up\" your daily allowance of carbs to eat at one meal.  · Proteins have very little or no carbs per serving. Examples of proteins are beef, chicken, turkey, fish, eggs, tofu, cheese, cottage cheese, and peanut butter. A serving size of meat is 3 ounces, which is about the size of a deck of cards. Examples of meat substitute serving sizes (equal to 1 ounce of meat) are 1/4 cup of cottage cheese, 1 egg, 1 tablespoon of peanut butter, and ½ cup of tofu. How can you eat out and still eat healthy? · Learn to estimate the serving sizes of foods that have carbohydrate. If you measure food at home, it will be easier to estimate the amount in a serving of restaurant food. · If the meal you order has too much carbohydrate (such as potatoes, corn, or baked beans), ask to have a low-carbohydrate food instead. Ask for a salad or green vegetables. · If you use insulin, check your blood sugar before and after eating out to help you plan how much to eat in the future. · If you eat more carbohydrate at a meal than you had planned, take a walk or do other exercise. This will help lower your blood sugar. What else should you know? · Limit saturated fat, such as the fat from meat and dairy products.  This is a The higher your blood pressure, the more your risk increases. Your doctor will give you a goal for your blood pressure. Your goal will be based on your health and your age. Lifestyle changes, such as eating healthy and being active, are always important to help lower blood pressure. You might also take medicine to reach your blood pressure goal.  Follow-up care is a key part of your treatment and safety. Be sure to make and go to all appointments, and call your doctor if you are having problems. It's also a good idea to know your test results and keep a list of the medicines you take. How can you care for yourself at home? Medical treatment  · If you stop taking your medicine, your blood pressure will go back up. You may take one or more types of medicine to lower your blood pressure. Be safe with medicines. Take your medicine exactly as prescribed. Call your doctor if you think you are having a problem with your medicine. · Talk to your doctor before you start taking aspirin every day. Aspirin can help certain people lower their risk of a heart attack or stroke. But taking aspirin isn't right for everyone, because it can cause serious bleeding. · See your doctor regularly. You may need to see the doctor more often at first or until your blood pressure comes down. · If you are taking blood pressure medicine, talk to your doctor before you take decongestants or anti-inflammatory medicine, such as ibuprofen. Some of these medicines can raise blood pressure. · Learn how to check your blood pressure at home. Lifestyle changes  · Stay at a healthy weight. This is especially important if you put on weight around the waist. Losing even 10 pounds can help you lower your blood pressure. · If your doctor recommends it, get more exercise. Walking is a good choice. Bit by bit, increase the amount you walk every day. Try for at least 30 minutes on most days of the week.  You also may want to swim, bike, or do other

## 2018-08-30 NOTE — TELEPHONE ENCOUNTER
Cover my Meds/ [facility] [employer] for [place] additional information sent for Trulicity PA, office will receive determination within the next 24 hours.

## 2018-10-10 RX ORDER — NYSTATIN 100000 U/G
CREAM TOPICAL
Qty: 60 G | Refills: 2 | Status: SHIPPED | OUTPATIENT
Start: 2018-10-10 | End: 2019-06-27 | Stop reason: SDUPTHER

## 2018-11-20 ENCOUNTER — TELEPHONE (OUTPATIENT)
Dept: FAMILY MEDICINE CLINIC | Age: 58
End: 2018-11-20

## 2018-11-27 ENCOUNTER — TELEPHONE (OUTPATIENT)
Dept: FAMILY MEDICINE CLINIC | Age: 58
End: 2018-11-27

## 2018-11-30 LAB
AVERAGE GLUCOSE: 180 MG/DL (ref 66–114)
HBA1C MFR BLD: 7.9 %

## 2018-12-28 ENCOUNTER — OFFICE VISIT (OUTPATIENT)
Dept: FAMILY MEDICINE CLINIC | Age: 58
End: 2018-12-28
Payer: MEDICARE

## 2018-12-28 VITALS
WEIGHT: 315 LBS | RESPIRATION RATE: 16 BRPM | SYSTOLIC BLOOD PRESSURE: 136 MMHG | BODY MASS INDEX: 43.66 KG/M2 | HEART RATE: 68 BPM | TEMPERATURE: 97.9 F | DIASTOLIC BLOOD PRESSURE: 84 MMHG

## 2018-12-28 DIAGNOSIS — Z12.5 SCREENING FOR PROSTATE CANCER: ICD-10-CM

## 2018-12-28 DIAGNOSIS — E66.01 MORBID OBESITY (HCC): ICD-10-CM

## 2018-12-28 DIAGNOSIS — E78.00 PURE HYPERCHOLESTEROLEMIA: ICD-10-CM

## 2018-12-28 DIAGNOSIS — I10 ESSENTIAL HYPERTENSION: ICD-10-CM

## 2018-12-28 DIAGNOSIS — L98.9 NON-HEALING SKIN LESION: ICD-10-CM

## 2018-12-28 DIAGNOSIS — E11.42 DIABETIC POLYNEUROPATHY ASSOCIATED WITH TYPE 2 DIABETES MELLITUS (HCC): ICD-10-CM

## 2018-12-28 PROCEDURE — G8417 CALC BMI ABV UP PARAM F/U: HCPCS | Performed by: FAMILY MEDICINE

## 2018-12-28 PROCEDURE — G8598 ASA/ANTIPLAT THER USED: HCPCS | Performed by: FAMILY MEDICINE

## 2018-12-28 PROCEDURE — 1036F TOBACCO NON-USER: CPT | Performed by: FAMILY MEDICINE

## 2018-12-28 PROCEDURE — 99214 OFFICE O/P EST MOD 30 MIN: CPT | Performed by: FAMILY MEDICINE

## 2018-12-28 PROCEDURE — 3045F PR MOST RECENT HEMOGLOBIN A1C LEVEL 7.0-9.0%: CPT | Performed by: FAMILY MEDICINE

## 2018-12-28 PROCEDURE — 3017F COLORECTAL CA SCREEN DOC REV: CPT | Performed by: FAMILY MEDICINE

## 2018-12-28 PROCEDURE — G8427 DOCREV CUR MEDS BY ELIG CLIN: HCPCS | Performed by: FAMILY MEDICINE

## 2018-12-28 PROCEDURE — G8484 FLU IMMUNIZE NO ADMIN: HCPCS | Performed by: FAMILY MEDICINE

## 2018-12-28 PROCEDURE — 2022F DILAT RTA XM EVC RTNOPTHY: CPT | Performed by: FAMILY MEDICINE

## 2018-12-28 RX ORDER — FLUOCINONIDE 0.5 MG/G
OINTMENT TOPICAL
Qty: 15 G | Refills: 0 | Status: SHIPPED | OUTPATIENT
Start: 2018-12-28 | End: 2019-01-04

## 2019-01-02 ASSESSMENT — ENCOUNTER SYMPTOMS
GASTROINTESTINAL NEGATIVE: 1
RESPIRATORY NEGATIVE: 1

## 2019-01-25 DIAGNOSIS — I10 ESSENTIAL HYPERTENSION: ICD-10-CM

## 2019-01-25 RX ORDER — AMLODIPINE BESYLATE 10 MG/1
TABLET ORAL
Qty: 90 TABLET | Refills: 3 | Status: SHIPPED | OUTPATIENT
Start: 2019-01-25 | End: 2019-12-19

## 2019-02-04 DIAGNOSIS — E11.49 OTHER DIABETIC NEUROLOGICAL COMPLICATION ASSOCIATED WITH TYPE 2 DIABETES MELLITUS (HCC): ICD-10-CM

## 2019-02-04 DIAGNOSIS — I10 ESSENTIAL HYPERTENSION: ICD-10-CM

## 2019-02-04 RX ORDER — DULOXETIN HYDROCHLORIDE 30 MG/1
CAPSULE, DELAYED RELEASE ORAL
Qty: 90 CAPSULE | Refills: 3 | Status: SHIPPED | OUTPATIENT
Start: 2019-02-04 | End: 2019-05-02 | Stop reason: CLARIF

## 2019-02-04 RX ORDER — METOPROLOL TARTRATE 50 MG/1
TABLET, FILM COATED ORAL
Qty: 180 TABLET | Refills: 3 | Status: SHIPPED | OUTPATIENT
Start: 2019-02-04 | End: 2019-12-16 | Stop reason: SDUPTHER

## 2019-03-05 RX ORDER — CANAGLIFLOZIN 300 MG/1
TABLET, FILM COATED ORAL
Qty: 30 TABLET | Refills: 11 | Status: SHIPPED | OUTPATIENT
Start: 2019-03-05 | End: 2019-04-18

## 2019-04-15 ENCOUNTER — TELEPHONE (OUTPATIENT)
Dept: FAMILY MEDICINE CLINIC | Age: 59
End: 2019-04-15

## 2019-04-15 NOTE — TELEPHONE ENCOUNTER
Fax received from pharmacy for [facility]. PA completed online at [employer] and pending review of up to 5 business day

## 2019-04-18 NOTE — TELEPHONE ENCOUNTER
[facility] Denial Letter received for Invokana 300 mg tablets. Per Dr. Dahiana Kulkarni medication change to [facility]. Patient informed.

## 2019-04-26 ENCOUNTER — APPOINTMENT (OUTPATIENT)
Dept: GENERAL RADIOLOGY | Age: 59
End: 2019-04-26
Payer: MEDICARE

## 2019-04-26 ENCOUNTER — HOSPITAL ENCOUNTER (EMERGENCY)
Age: 59
Discharge: HOME | End: 2019-04-26
Attending: EMERGENCY MEDICINE
Payer: MEDICARE

## 2019-04-26 VITALS
DIASTOLIC BLOOD PRESSURE: 96 MMHG | TEMPERATURE: 97.7 F | RESPIRATION RATE: 18 BRPM | SYSTOLIC BLOOD PRESSURE: 160 MMHG | HEART RATE: 78 BPM | OXYGEN SATURATION: 93 %

## 2019-04-26 DIAGNOSIS — M10.031 IDIOPATHIC GOUT OF RIGHT WRIST, UNSPECIFIED CHRONICITY: Primary | ICD-10-CM

## 2019-04-26 DIAGNOSIS — M77.11 LATERAL EPICONDYLITIS OF RIGHT ELBOW: ICD-10-CM

## 2019-04-26 LAB
ANION GAP SERPL CALCULATED.3IONS-SCNC: 16 MEQ/L (ref 8–16)
BASOPHILS # BLD: 0.4 %
BASOPHILS ABSOLUTE: 0 THOU/MM3 (ref 0–0.1)
BUN BLDV-MCNC: 15 MG/DL (ref 7–22)
CALCIUM SERPL-MCNC: 9.2 MG/DL (ref 8.5–10.5)
CHLORIDE BLD-SCNC: 101 MEQ/L (ref 98–111)
CO2: 22 MEQ/L (ref 23–33)
CREAT SERPL-MCNC: 0.9 MG/DL (ref 0.4–1.2)
EOSINOPHIL # BLD: 2.9 %
EOSINOPHILS ABSOLUTE: 0.3 THOU/MM3 (ref 0–0.4)
ERYTHROCYTE [DISTWIDTH] IN BLOOD BY AUTOMATED COUNT: 13 % (ref 11.5–14.5)
ERYTHROCYTE [DISTWIDTH] IN BLOOD BY AUTOMATED COUNT: 44.8 FL (ref 35–45)
GFR SERPL CREATININE-BSD FRML MDRD: 86 ML/MIN/1.73M2
GLUCOSE BLD-MCNC: 213 MG/DL (ref 70–108)
HCT VFR BLD CALC: 47.7 % (ref 42–52)
HEMOGLOBIN: 16.3 GM/DL (ref 14–18)
IMMATURE GRANS (ABS): 0.03 THOU/MM3 (ref 0–0.07)
IMMATURE GRANULOCYTES: 0.3 %
LYMPHOCYTES # BLD: 22.9 %
LYMPHOCYTES ABSOLUTE: 2.1 THOU/MM3 (ref 1–4.8)
MCH RBC QN AUTO: 32.3 PG (ref 26–33)
MCHC RBC AUTO-ENTMCNC: 34.2 GM/DL (ref 32.2–35.5)
MCV RBC AUTO: 94.5 FL (ref 80–94)
MONOCYTES # BLD: 11 %
MONOCYTES ABSOLUTE: 1 THOU/MM3 (ref 0.4–1.3)
NUCLEATED RED BLOOD CELLS: 0 /100 WBC
OSMOLALITY CALCULATION: 284.7 MOSMOL/KG (ref 275–300)
PLATELET # BLD: 228 THOU/MM3 (ref 130–400)
PMV BLD AUTO: 8.8 FL (ref 9.4–12.4)
POTASSIUM SERPL-SCNC: 4.5 MEQ/L (ref 3.5–5.2)
RBC # BLD: 5.05 MILL/MM3 (ref 4.7–6.1)
SEDIMENTATION RATE, ERYTHROCYTE: 17 MM/HR (ref 0–10)
SEG NEUTROPHILS: 62.5 %
SEGMENTED NEUTROPHILS ABSOLUTE COUNT: 5.6 THOU/MM3 (ref 1.8–7.7)
SODIUM BLD-SCNC: 139 MEQ/L (ref 135–145)
URIC ACID: 7.8 MG/DL (ref 3.7–7)
WBC # BLD: 9 THOU/MM3 (ref 4.8–10.8)

## 2019-04-26 PROCEDURE — 36415 COLL VENOUS BLD VENIPUNCTURE: CPT

## 2019-04-26 PROCEDURE — 86038 ANTINUCLEAR ANTIBODIES: CPT

## 2019-04-26 PROCEDURE — 85651 RBC SED RATE NONAUTOMATED: CPT

## 2019-04-26 PROCEDURE — 73080 X-RAY EXAM OF ELBOW: CPT

## 2019-04-26 PROCEDURE — 85025 COMPLETE CBC W/AUTO DIFF WBC: CPT

## 2019-04-26 PROCEDURE — 99283 EMERGENCY DEPT VISIT LOW MDM: CPT

## 2019-04-26 PROCEDURE — 96374 THER/PROPH/DIAG INJ IV PUSH: CPT

## 2019-04-26 PROCEDURE — 84550 ASSAY OF BLOOD/URIC ACID: CPT

## 2019-04-26 PROCEDURE — 2709999900 HC NON-CHARGEABLE SUPPLY

## 2019-04-26 PROCEDURE — 6360000002 HC RX W HCPCS: Performed by: EMERGENCY MEDICINE

## 2019-04-26 PROCEDURE — 80048 BASIC METABOLIC PNL TOTAL CA: CPT

## 2019-04-26 PROCEDURE — 73110 X-RAY EXAM OF WRIST: CPT

## 2019-04-26 RX ORDER — INDOMETHACIN 50 MG/1
50 CAPSULE ORAL 3 TIMES DAILY
Qty: 21 CAPSULE | Refills: 0 | Status: SHIPPED | OUTPATIENT
Start: 2019-04-26 | End: 2019-05-02

## 2019-04-26 RX ORDER — KETOROLAC TROMETHAMINE 30 MG/ML
30 INJECTION, SOLUTION INTRAMUSCULAR; INTRAVENOUS ONCE
Status: COMPLETED | OUTPATIENT
Start: 2019-04-26 | End: 2019-04-26

## 2019-04-26 RX ADMIN — KETOROLAC TROMETHAMINE 30 MG: 30 INJECTION, SOLUTION INTRAMUSCULAR; INTRAVENOUS at 13:31

## 2019-04-26 ASSESSMENT — PAIN DESCRIPTION - LOCATION: LOCATION: ARM

## 2019-04-26 ASSESSMENT — PAIN SCALES - GENERAL
PAINLEVEL_OUTOF10: 10
PAINLEVEL_OUTOF10: 10

## 2019-04-26 ASSESSMENT — PAIN DESCRIPTION - ORIENTATION: ORIENTATION: RIGHT

## 2019-04-26 NOTE — ED PROVIDER NOTES
[id number] [address]   eMERGENCY dEPARTMENT eNCOUnter        CHIEF COMPLAINT    Chief Complaint   Patient presents with    Other     arm swelling       Nurses Notes reviewed and I agree except as noted in the HPI. HPI    Angelesleia Patel is a 62 y.o. male who presents for evaluation of right arm and wrist swelling and pain. The patient states he fell a couple of weeks ago, pushing a car and slipped. He states the pain eventually resolved, but returned yesterday with more pain than before. The patient's wife notes a history of diabetes, and states he takes a diuretic that is combined with his hypertension medication. He states he is unsure if he has arthritis, and denies gout to his knowledge. He denies kidney complications, including kidney disease, and denies taking insulin for his diabetes management. The patient did not state any other complaints or symptoms during my initial encounter. REVIEW OF SYSTEMS    Review of Systems   Musculoskeletal: Positive for arthralgias (right elbow and wrist) and joint swelling (Right elbow and wrist). PAST MEDICAL HISTORY     has a past medical history of Diabetes mellitus ([facility] Hyperlipidemia, Hypertension, Type 2 diabetes mellitus without complication ([facility] and Type II or unspecified type diabetes mellitus without mention of complication, not stated as uncontrolled. SURGICAL HISTORY   has a past surgical history that includes Varicose vein surgery; Cardiac catheterization; and knee surgery (Right, 12/2017). CURRENT MEDICATIONS    Discharge Medication List as of 4/26/2019  1:43 PM      CONTINUE these medications which have NOT CHANGED    Details   Ertugliflozin L-PyroglutamicAc (STEGLATRO) 15 MG TABS Take 1 tablet po daily, Disp-30 tablet, R-11Normal      !!  DULoxetine (CYMBALTA) 30 MG extended release capsule take 1 capsule by mouth once daily, Disp-90 capsule, R-3Normal      metoprolol tartrate (LOPRESSOR) 50 MG tablet take 1 tablet by mouth twice a day, Disp-180 tablet, R-3Normal      amLODIPine (NORVASC) 10 MG tablet take 1 tablet by mouth once daily, Disp-90 tablet, R-3Normal      metFORMIN (GLUCOPHAGE) 1000 MG tablet take 1 tablet by mouth twice a day with meals, Disp-180 tablet, R-3Normal      VENTOLIN  (90 Base) MCG/ACT inhaler Inhale 2 puffs into the lungs every 4 hours as needed for Wheezing or Shortness of Breath, Disp-1 Inhaler, R-0, DAWNormal      nystatin (MYCOSTATIN) 533446 UNIT/GM cream Apply topically 2 times daily. , Disp-60 g, R-2, Normal      !! DULoxetine (CYMBALTA) 30 MG extended release capsule Take 30 mg by mouth dailyHistorical Med      glipiZIDE (GLUCOTROL XL) 10 MG extended release tablet take 1 tablet by mouth once daily, Disp-90 tablet, R-3Normal      Insulin Pen Needle 31G X 5 MM MISC Disp-100 each, R-0, NormalUse weekly with Trulicity      pravastatin (PRAVACHOL) 80 MG tablet take 1 tablet by mouth once daily, Disp-90 tablet, R-3Normal      valsartan-hydrochlorothiazide (DIOVAN-HCT) 320-12.5 MG per tablet take 1 tablet by mouth once daily, Disp-90 tablet, R-3Normal      nitroGLYCERIN (NITROSTAT) 0.4 MG SL tablet Place 0.4 mg under the tongue every 5 minutes as needed for Chest pain up to max of 3 total doses. If no relief after 1 dose, call 911. Historical Med      aspirin 81 MG EC tablet Take 1 tablet by mouth daily, Disp-90 tablet, R-3Normal      FISH OIL Take  by mouth daily. Multiple Vitamins-Minerals (CENTRUM PO) Take 1 tablet by mouth daily. !! - Potential duplicate medications found. Please discuss with provider. ALLERGIES    is allergic to latex and other. FAMILY HISTORY    indicated that his mother is . He indicated that his father is . family history includes Cancer in his father; Heart Disease in his mother. SOCIAL HISTORY     reports that he quit smoking about 6 years ago. His smoking use included cigarettes.  He has a 20.00 pack-year components within normal limits   GLOMERULAR FILTRATION RATE, ESTIMATED - Abnormal; Notable for the following components:    Est, Glom Filt Rate 86 (*)     All other components within normal limits   ANION GAP   OSMOLALITY   JANETH SCREEN WITH REFLEX   RA LATEX SCREEN     All other unresulted laboratory test above are normal:    Vitals:    Vitals:    04/26/19 1202 04/26/19 1203 04/26/19 1358   BP:  (!) 160/96    Pulse: 78     Resp:   18   Temp: 97.7 °F (36.5 °C)     TempSrc: Oral     SpO2: 93%         EMERGENCY DEPARTMENT COURSE:    Medications   ketorolac (TORADOL) injection 30 mg (30 mg Intramuscular Given 4/26/19 1331)       The pt was seen and evaluated by me. Within the department, I observed the pt's vital signs to be within acceptable range, with the exception of excess weight. Laboratory and Radiological studieswere performed, results were reviewed with the patient. Within the department, the pt was treated with a Toradol injection. I observed the pt's condition to improve and remain hemodynamically stable during the duration of their stay. I explained my proposed course of treatmentto the pt, and they were amenable to my decision. They were discharged home, and they will return to the ED if their symptoms become more severe in nature, or otherwise change. Discussed about gout and advised the patient to protein intake, likewise water pill that can increase uric acid. CRITICAL CARE:   None. CONSULTS:  None    PROCEDURES:  None. FINAL IMPRESSION       1. Idiopathic gout of right wrist, unspecified chronicity    2.  Lateral epicondylitis of right elbow          DISPOSITION/PLAN  PATIENT REFERRED TO:  DO Marcelo Ulrich, [address] [place], [place] [address]  [phone]    Schedule an appointment as soon as possible for a visit in 4 days      [facility] [address] [address] EMERGENCY DEPT  [address]    As needed    DISCHARGE MEDICATIONS:  Discharge Medication

## 2019-04-26 NOTE — LETTER
Carolann CATHERINE AM OFFRONDA [email or web address].RAFY, [address]  Phone: [phone]  Fax: [phone]     April 29, 2019    [address]    Dear Dejan Tsang,    This letter is regarding your Emergency Department (ED) visit at [address] [address] on 4/26/19. Mary Green wanted to make sure that you understand your discharge instructions and that you were able to fill any prescriptions that may have been ordered for you. Please contact the office at the above phone number if the ED advised you to follow up with Mary Green, or if you have any further questions or needs. Also did you know -   *Visiting the ED for a non-emergency could result in higher co-pays than you would normally be subject to paying? [facility]) practices can often offer you an appointment on the same day that you call. *We have some [address] [place] [address] offices that offer Walk-in appointments; check our website for availability in your community, [email or web address].      *Evisits are now available for patients for $36 through [employer] for certain conditions:  * Sinus, cold and or cough       * Diarrhea            * Headache  * Heartburn                                * Poison [place]          * Back pain     * Urinary problems                         If you do not have [employer] and are interested, please contact the office and a staff member may assist you or go to [email or web address].     Sincerely,     Cristina Galeas DO and your [facility] [place]

## 2019-04-26 NOTE — ED NOTES
Pt administered pain medication with education. Pt will be monitored and then pain reassessed before discharge.       [facility]  04/26/19 [id number]

## 2019-04-28 LAB — ANA SCREEN: NORMAL

## 2019-04-29 ENCOUNTER — TELEPHONE (OUTPATIENT)
Dept: FAMILY MEDICINE CLINIC | Age: 59
End: 2019-04-29

## 2019-05-02 ENCOUNTER — OFFICE VISIT (OUTPATIENT)
Dept: FAMILY MEDICINE CLINIC | Age: 59
End: 2019-05-02
Payer: MEDICARE

## 2019-05-02 VITALS
BODY MASS INDEX: 44.1 KG/M2 | HEIGHT: 71 IN | RESPIRATION RATE: 16 BRPM | OXYGEN SATURATION: 98 % | DIASTOLIC BLOOD PRESSURE: 82 MMHG | WEIGHT: 315 LBS | SYSTOLIC BLOOD PRESSURE: 136 MMHG | HEART RATE: 67 BPM

## 2019-05-02 DIAGNOSIS — S63.501A RIGHT WRIST SPRAIN, INITIAL ENCOUNTER: ICD-10-CM

## 2019-05-02 DIAGNOSIS — M77.11 LATERAL EPICONDYLITIS OF RIGHT ELBOW: Primary | ICD-10-CM

## 2019-05-02 PROCEDURE — 1036F TOBACCO NON-USER: CPT | Performed by: FAMILY MEDICINE

## 2019-05-02 PROCEDURE — 3017F COLORECTAL CA SCREEN DOC REV: CPT | Performed by: FAMILY MEDICINE

## 2019-05-02 PROCEDURE — G8417 CALC BMI ABV UP PARAM F/U: HCPCS | Performed by: FAMILY MEDICINE

## 2019-05-02 PROCEDURE — 99213 OFFICE O/P EST LOW 20 MIN: CPT | Performed by: FAMILY MEDICINE

## 2019-05-02 PROCEDURE — G8599 NO ASA/ANTIPLAT THER USE RNG: HCPCS | Performed by: FAMILY MEDICINE

## 2019-05-02 PROCEDURE — G8427 DOCREV CUR MEDS BY ELIG CLIN: HCPCS | Performed by: FAMILY MEDICINE

## 2019-05-02 RX ORDER — VALSARTAN AND HYDROCHLOROTHIAZIDE 320; 12.5 MG/1; MG/1
TABLET, FILM COATED ORAL
Qty: 90 TABLET | Refills: 3 | Status: SHIPPED | OUTPATIENT
Start: 2019-05-02 | End: 2020-03-25

## 2019-05-02 ASSESSMENT — PATIENT HEALTH QUESTIONNAIRE - PHQ9
2. FEELING DOWN, DEPRESSED OR HOPELESS: 0
SUM OF ALL RESPONSES TO PHQ QUESTIONS 1-9: 0
SUM OF ALL RESPONSES TO PHQ QUESTIONS 1-9: 0
SUM OF ALL RESPONSES TO PHQ9 QUESTIONS 1 & 2: 0
1. LITTLE INTEREST OR PLEASURE IN DOING THINGS: 0

## 2019-05-02 ASSESSMENT — ENCOUNTER SYMPTOMS
RESPIRATORY NEGATIVE: 1
GASTROINTESTINAL NEGATIVE: 1

## 2019-05-02 NOTE — PROGRESS NOTES
Visit Information    Have you changed or started any medications since your last visit including any over-the-counter medicines, vitamins, or herbal medicines? yes - medication list updated   Are you having any side effects from any of your medications? -  no  Have you stopped taking any of your medications? Is so, why? -  no    Have you seen any other physician or provider since your last visit? No  Have you had any other diagnostic tests since your last visit? Yes - Records Obtained  Have you been seen in the emergency room and/or had an admission to a hospital since we last saw you? Yes - Records Obtained    Have you activated your [employer] account? If not, what are your barriers?  No: does not want     Patient Care Team:  Meg Rubinstein, DO as PCP - General (Family Medicine)  Meg Rubinstein, DO as PCP - [facility] Attributed Provider  Renetta Ramirez MD as Consulting Physician (Orthopedic Surgery)    Medical History Review  Past Medical, Family, and Social History reviewed and does contribute to the patient presenting condition    Health Maintenance   Topic Date Due    Shingles Vaccine (1 of 2) 01/02/2008    Colon cancer screen colonoscopy  01/02/2008    Diabetic retinal exam  03/18/2016    Diabetic microalbuminuria test  02/15/2019    Lipid screen  02/15/2019    Diabetic foot exam  08/29/2019    Flu vaccine (Season Ended) 09/01/2019    A1C test (Diabetic or Prediabetic)  11/29/2019    Potassium monitoring  04/26/2020    Creatinine monitoring  04/26/2020    DTaP/Tdap/Td vaccine (2 - Tdap) 03/18/2025    Pneumococcal 0-64 years Vaccine  Completed    Hepatitis C screen  Completed    HIV screen  Completed

## 2019-05-02 NOTE — PROGRESS NOTES
Subjective:      Patient ID: Wayne Jesus is a 62 y.o. male. HPI:    Chief Complaint   Patient presents with   [place] ED Follow-up     [facility] 4/26/19 gout-right elbow and arm     ED follow up. HPI    Wayne Jesus is a 62 y.o. male who presents for evaluation of right arm and wrist swelling and pain. The patient states he fell a couple of weeks ago, pushing a car and slipped. He states the pain eventually resolved, but returned yesterday with more pain than before. The patient's wife notes a history of diabetes, and states he takes a diuretic that is combined with his hypertension medication. He states he is unsure if he has arthritis, and denies gout to his knowledge. He denies kidney complications, including kidney disease, and denies taking insulin for his diabetes management. The patient did not state any other complaints or symptoms during my initial encounter. Dx: gout. In the ED, gave shot of Toradol which really helped. Sent home on Indocin. The right elbow and wrist feel much better now. No swelling. Patient Active Problem List   Diagnosis    HTN (hypertension)    Hyperlipidemia    GERD (gastroesophageal reflux disease)    ED (erectile dysfunction)    Morbid obesity ([address]    Non-occlusive coronary artery disease    Diabetes mellitus type II, controlled ([address]    Uncontrolled diabetes mellitus type 2 without complications ([address]    Controlled type 2 diabetes mellitus without complication, without long-term current use of insulin ([address]    Diabetic neuropathy ([address]     Past Surgical History:   Procedure Laterality Date    CARDIAC CATHETERIZATION      KNEE SURGERY Right 12/2017    VARICOSE VEIN SURGERY       Prior to Admission medications    Medication Sig Start Date End Date Taking?  Authorizing Provider   indomethacin (INDOCIN) 50 MG capsule Take 1 capsule by mouth 3 times daily Take it with food and as needed 4/26/19  Yes Micah Downs MD   Ertugliflozin Respiratory: Negative. Cardiovascular: Negative. Gastrointestinal: Negative. Musculoskeletal: Negative. Negative for arthralgias (elbown and wrist pain resolved at this time). All other systems reviewed and are negative. Objective:   Physical Exam   Constitutional: He is oriented to person, place, and time. He appears well-developed and well-nourished. HENT:   Head: Normocephalic and atraumatic. Right Ear: Tympanic membrane normal.   Left Ear: Tympanic membrane normal.   Mouth/Throat: Oropharynx is clear and moist and mucous membranes are normal.   Cardiovascular: Normal rate, regular rhythm and normal heart sounds. No murmur heard. Pulmonary/Chest: Effort normal and breath sounds normal.   Abdominal: Soft. Bowel sounds are normal.   Musculoskeletal: He exhibits no edema. Right elbow: Normal.       Right wrist: Normal.   Neurological: He is alert and oriented to person, place, and time. Skin: Skin is warm and dry. Psychiatric: He has a normal mood and affect. His behavior is normal.   Nursing note and vitals reviewed. Assessment:       Diagnosis Orders   1. Lateral epicondylitis of right elbow     2.  Right wrist sprain, initial encounter             Plan:      -  Pain started after a known injury, sprain likely  -  Doubt gout  -  Asymptomatic at this time, tania Indocin  -  RTO prn        Oliver Gaytan DO

## 2019-05-14 ENCOUNTER — TELEPHONE (OUTPATIENT)
Dept: FAMILY MEDICINE CLINIC | Age: 59
End: 2019-05-14

## 2019-05-14 RX ORDER — NAPROXEN 500 MG/1
500 TABLET ORAL 2 TIMES DAILY PRN
Qty: 60 TABLET | Refills: 2 | Status: SHIPPED | OUTPATIENT
Start: 2019-05-14 | End: 2019-11-08

## 2019-05-14 NOTE — TELEPHONE ENCOUNTER
No need to fill indomethacin. As far as valsartan, should be valsartan/hctz and he has been on this medication for years.

## 2019-05-14 NOTE — TELEPHONE ENCOUNTER
Dayanara Ranks on HIPAA informed, voiced understanding. Left elbow is flaring up again throbbing type pain, reason for asking for indomethacin.

## 2019-05-14 NOTE — TELEPHONE ENCOUNTER
Wife, Jacquline Meckel, on HIPAA, has 2 questions. 1) The hospital gave pt Indomethacin. Pt doesn't feel that it helped his elbow, but wants to make sure that Dr Phillip Duran doesn't want it continued. [facility]  2)  They received Valsartan thru mail order, ordered 5/2/19. They don't remember Dr Phillip Duran telling him to take a new medication. Should he take it?    Call Jacquline Meckel at [phone]

## 2019-05-29 DIAGNOSIS — E78.5 HYPERLIPIDEMIA, UNSPECIFIED HYPERLIPIDEMIA TYPE: ICD-10-CM

## 2019-05-29 RX ORDER — PRAVASTATIN SODIUM 80 MG/1
TABLET ORAL
Qty: 30 TABLET | Refills: 11 | Status: SHIPPED | OUTPATIENT
Start: 2019-05-29 | End: 2020-05-19

## 2019-06-18 ENCOUNTER — TELEPHONE (OUTPATIENT)
Dept: FAMILY MEDICINE CLINIC | Age: 59
End: 2019-06-18

## 2019-06-18 NOTE — TELEPHONE ENCOUNTER
Spoke with pt regarding his upcoming appt on 6/27/19 at 11am.  Confirmed appt and reminded pt to get labs done.

## 2019-06-22 LAB
ALBUMIN SERPL-MCNC: 4.2 G/DL (ref 3.2–5.3)
ALK PHOSPHATASE: 25 U/L (ref 39–130)
ALT SERPL-CCNC: 19 U/L (ref 0–40)
ANION GAP SERPL CALCULATED.3IONS-SCNC: 12 MMOL/L (ref 4–12)
AST SERPL-CCNC: 17 U/L (ref 0–41)
AVERAGE GLUCOSE: 189 MG/DL (ref 66–114)
BILIRUB SERPL-MCNC: 0.7 MG/DL (ref 0.3–1.2)
BUN BLDV-MCNC: 18 MG/DL (ref 5–27)
CALCIUM SERPL-MCNC: 9.1 MG/DL (ref 8.5–10.5)
CHLORIDE BLD-SCNC: 99 MMOL/L (ref 98–109)
CHOLESTEROL/HDL RATIO: 4.4 (ref 1–5)
CHOLESTEROL: 160 MG/DL (ref 150–200)
CO2: 29 MMOL/L (ref 22–32)
CREAT SERPL-MCNC: 0.79 MG/DL (ref 0.6–1.3)
CREATINE, URINE: 44.71 MG/DL
EGFR AFRICAN AMERICAN: >60 ML/MIN/1.73SQ.M
EGFR IF NONAFRICAN AMERICAN: >60 ML/MIN/1.73SQ.M
GLUCOSE: 167 MG/DL (ref 65–99)
HBA1C MFR BLD: 8.2 %
HDLC SERPL-MCNC: 36 MG/DL
LDL CHOLESTEROL CALCULATED: 62 MG/DL
LDL/HDL RATIO: 1.7
MICROALBUMIN/CREAT 24H UR: 4.8 MG/DL (ref 0–1.9)
MICROALBUMIN/CREAT UR-RTO: 107.4 MG/G CREAT (ref 0–30)
POTASSIUM SERPL-SCNC: 4.4 MMOL/L (ref 3.5–5)
PSA, ULTRASENSITIVE: 0.28 NG/ML (ref 0–4)
SODIUM BLD-SCNC: 140 MMOL/L (ref 134–146)
TOTAL PROTEIN: 7 G/DL (ref 6–8)
TRIGL SERPL-MCNC: 308 MG/DL (ref 27–150)
TSH SERPL DL<=0.05 MIU/L-ACNC: 2.49 UIU/ML (ref 0.49–4.67)
VLDLC SERPL CALC-MCNC: 62 MG/DL (ref 0–30)

## 2019-06-27 ENCOUNTER — OFFICE VISIT (OUTPATIENT)
Dept: FAMILY MEDICINE CLINIC | Age: 59
End: 2019-06-27
Payer: MEDICARE

## 2019-06-27 VITALS
RESPIRATION RATE: 12 BRPM | WEIGHT: 315 LBS | HEART RATE: 62 BPM | BODY MASS INDEX: 44.25 KG/M2 | DIASTOLIC BLOOD PRESSURE: 82 MMHG | SYSTOLIC BLOOD PRESSURE: 122 MMHG | OXYGEN SATURATION: 98 %

## 2019-06-27 DIAGNOSIS — Z12.11 SCREEN FOR COLON CANCER: ICD-10-CM

## 2019-06-27 DIAGNOSIS — I10 ESSENTIAL HYPERTENSION: ICD-10-CM

## 2019-06-27 DIAGNOSIS — E11.42 DIABETIC POLYNEUROPATHY ASSOCIATED WITH TYPE 2 DIABETES MELLITUS (HCC): ICD-10-CM

## 2019-06-27 DIAGNOSIS — E78.5 HYPERLIPIDEMIA, UNSPECIFIED HYPERLIPIDEMIA TYPE: ICD-10-CM

## 2019-06-27 DIAGNOSIS — E66.01 MORBID OBESITY (HCC): ICD-10-CM

## 2019-06-27 PROCEDURE — G8417 CALC BMI ABV UP PARAM F/U: HCPCS | Performed by: FAMILY MEDICINE

## 2019-06-27 PROCEDURE — 2022F DILAT RTA XM EVC RTNOPTHY: CPT | Performed by: FAMILY MEDICINE

## 2019-06-27 PROCEDURE — G8599 NO ASA/ANTIPLAT THER USE RNG: HCPCS | Performed by: FAMILY MEDICINE

## 2019-06-27 PROCEDURE — 99214 OFFICE O/P EST MOD 30 MIN: CPT | Performed by: FAMILY MEDICINE

## 2019-06-27 PROCEDURE — 3017F COLORECTAL CA SCREEN DOC REV: CPT | Performed by: FAMILY MEDICINE

## 2019-06-27 PROCEDURE — 3045F PR MOST RECENT HEMOGLOBIN A1C LEVEL 7.0-9.0%: CPT | Performed by: FAMILY MEDICINE

## 2019-06-27 PROCEDURE — 1036F TOBACCO NON-USER: CPT | Performed by: FAMILY MEDICINE

## 2019-06-27 PROCEDURE — G8427 DOCREV CUR MEDS BY ELIG CLIN: HCPCS | Performed by: FAMILY MEDICINE

## 2019-06-27 RX ORDER — NYSTATIN 100000 U/G
CREAM TOPICAL
Qty: 60 G | Refills: 2 | Status: SHIPPED | OUTPATIENT
Start: 2019-06-27 | End: 2021-02-25 | Stop reason: SDUPTHER

## 2019-06-27 ASSESSMENT — ENCOUNTER SYMPTOMS
RESPIRATORY NEGATIVE: 1
GASTROINTESTINAL NEGATIVE: 1

## 2019-06-27 NOTE — PROGRESS NOTES
Patient wants referral to get colonoscopy done, no preference on whom he sees. Visit Information    Have you changed or started any medications since your last visit including any over-the-counter medicines, vitamins, or herbal medicines? no   Are you having any side effects from any of your medications? -  no  Have you stopped taking any of your medications? Is so, why? -  yes  Have you seen any other physician or provider since your last visit? No  Have you had any other diagnostic tests since your last visit? No  Have you been seen in the emergency room and/or had an admission to a hospital since we last saw you? No  Have you activated your [employer] account? If not, what are your barriers?  No:      Patient Care Team:  Joan Daigle DO as PCP - General (Family Medicine)  Joan Daigle DO [facility]  Rona Sullivan MD as Consulting Physician (Orthopedic Surgery)    Medical History Review  Past Medical, Family, and Social History reviewed and does contribute to the patient presenting condition    Health Maintenance   Topic Date Due    Shingles Vaccine (1 of 2) 01/02/2008    Colon cancer screen colonoscopy  01/02/2008    Diabetic retinal exam  03/18/2016    Diabetic foot exam  08/29/2019    Flu vaccine (Season Ended) 09/01/2019    A1C test (Diabetic or Prediabetic)  06/21/2020    Diabetic microalbuminuria test  06/21/2020    Lipid screen  06/21/2020    Potassium monitoring  06/21/2020    Creatinine monitoring  06/21/2020    DTaP/Tdap/Td vaccine (2 - Tdap) 03/18/2025    Pneumococcal 0-64 years Vaccine  Completed    Hepatitis C screen  Completed    HIV screen  Completed

## 2019-07-01 RX ORDER — ALBUTEROL SULFATE 90 UG/1
2 AEROSOL, METERED RESPIRATORY (INHALATION) EVERY 4 HOURS PRN
Qty: 1 INHALER | Refills: 5 | Status: SHIPPED | OUTPATIENT
Start: 2019-07-01 | End: 2020-12-01 | Stop reason: SDUPTHER

## 2019-08-01 RX ORDER — GLIPIZIDE 10 MG/1
TABLET, FILM COATED, EXTENDED RELEASE ORAL
Qty: 30 TABLET | Refills: 4 | Status: SHIPPED | OUTPATIENT
Start: 2019-08-01 | End: 2019-12-16 | Stop reason: SDUPTHER

## 2019-08-16 ENCOUNTER — OFFICE VISIT (OUTPATIENT)
Dept: CARDIOLOGY CLINIC | Age: 59
End: 2019-08-16
Payer: MEDICARE

## 2019-08-16 VITALS
HEART RATE: 64 BPM | HEIGHT: 72 IN | WEIGHT: 315 LBS | BODY MASS INDEX: 42.66 KG/M2 | SYSTOLIC BLOOD PRESSURE: 158 MMHG | DIASTOLIC BLOOD PRESSURE: 94 MMHG

## 2019-08-16 DIAGNOSIS — E78.49 OTHER HYPERLIPIDEMIA: ICD-10-CM

## 2019-08-16 DIAGNOSIS — I10 ESSENTIAL HYPERTENSION: Primary | ICD-10-CM

## 2019-08-16 PROCEDURE — G8599 NO ASA/ANTIPLAT THER USE RNG: HCPCS | Performed by: INTERNAL MEDICINE

## 2019-08-16 PROCEDURE — 1036F TOBACCO NON-USER: CPT | Performed by: INTERNAL MEDICINE

## 2019-08-16 PROCEDURE — 93000 ELECTROCARDIOGRAM COMPLETE: CPT | Performed by: INTERNAL MEDICINE

## 2019-08-16 PROCEDURE — 3017F COLORECTAL CA SCREEN DOC REV: CPT | Performed by: INTERNAL MEDICINE

## 2019-08-16 PROCEDURE — G8417 CALC BMI ABV UP PARAM F/U: HCPCS | Performed by: INTERNAL MEDICINE

## 2019-08-16 PROCEDURE — 99213 OFFICE O/P EST LOW 20 MIN: CPT | Performed by: INTERNAL MEDICINE

## 2019-08-16 PROCEDURE — G8427 DOCREV CUR MEDS BY ELIG CLIN: HCPCS | Performed by: INTERNAL MEDICINE

## 2019-08-16 NOTE — PROGRESS NOTES
limits. M-Mode/2D Measurements & Calculations      LV Diastolic    LV Systolic Dimension: 4 cm AV Cusp Separation: 2.3 cmLA   Dimension: 5.3  LV Volume Diastolic: 987 ml Dimension: 3.9 cmAO Root   cm              LV Volume Systolic: 70 ml   Dimension: 3.7 cm   LV FS:24.5 %    LV EDV/LV EDV Index: 135   LV PW           ml/52 m^2LV ESV/LV ESV   Diastolic: 0.9  Index: 70 SS/24 m^2   cm              EF Calculated: 48.2 %       RV Diastolic Dimension: 2.8 cm   Septum   Diastolic: 1 cm                             LA/Aorta: 1.05     Doppler Measurements & Calculations      MV Peak E-Wave: 58.2 cm/s  AV Peak Velocity: 109 LVOT Peak Velocity: 93.8   MV Peak A-Wave: 76 cm/s    cm/s                  cm/s   MV E/A Ratio: 0.77         AV Peak Gradient:     LVOT Peak Gradient: 4   MV Peak Gradient: 1.35     4.75 mmHg             mmHg   mmHg                                                    TV Peak E-Wave: 44.4 cm/s   MV Deceleration Time: 229                        TV Peak A-Wave: 43.9 cm/s   msec                                                    TV Peak Gradient: 0.79                                                    mmHg   MV E' Septal Velocity:                           TR Velocity:202 cm/s   5.95 cm/s                  AV DVI (Vmax):0.86    TR Gradient:16.32 mmHg   MV A' Septal Velocity:                           PV Peak Velocity: 51.8   8.58 cm/s                                        cm/s   MV E' Lateral Velocity:                          PV Peak Gradient: 1.07   5.95 cm/s                                        mmHg   MV A' Lateral Velocity:   7.8 cm/s   E/E' septal: 9.78   E/E' lateral: 9.78     [email or web address] [id number]       STRESS:  9/2017 -    Lexiscan EKG stress test is not suggestive for ischemia. This Nuclear Medicine study was not conclusive due to significant artifact.     CATH:  10/2012

## 2019-11-08 ENCOUNTER — HOSPITAL ENCOUNTER (EMERGENCY)
Age: 59
Discharge: HOME | End: 2019-11-08
Payer: MEDICARE

## 2019-11-08 VITALS
RESPIRATION RATE: 19 BRPM | DIASTOLIC BLOOD PRESSURE: 82 MMHG | OXYGEN SATURATION: 95 % | TEMPERATURE: 97.7 F | BODY MASS INDEX: 42.99 KG/M2 | HEART RATE: 82 BPM | SYSTOLIC BLOOD PRESSURE: 169 MMHG | WEIGHT: 315 LBS

## 2019-11-08 DIAGNOSIS — S16.1XXA STRAIN OF NECK MUSCLE, INITIAL ENCOUNTER: Primary | ICD-10-CM

## 2019-11-08 PROCEDURE — 99282 EMERGENCY DEPT VISIT SF MDM: CPT

## 2019-11-08 PROCEDURE — 6370000000 HC RX 637 (ALT 250 FOR IP): Performed by: NURSE PRACTITIONER

## 2019-11-08 RX ORDER — NAPROXEN 250 MG/1
500 TABLET ORAL ONCE
Status: COMPLETED | OUTPATIENT
Start: 2019-11-08 | End: 2019-11-08

## 2019-11-08 RX ORDER — TIZANIDINE 4 MG/1
4 TABLET ORAL EVERY 6 HOURS PRN
Qty: 20 TABLET | Refills: 0 | Status: SHIPPED | OUTPATIENT
Start: 2019-11-08 | End: 2022-04-27

## 2019-11-08 RX ORDER — TIZANIDINE 4 MG/1
4 TABLET ORAL ONCE
Status: COMPLETED | OUTPATIENT
Start: 2019-11-08 | End: 2019-11-08

## 2019-11-08 RX ORDER — LIDOCAINE 4 G/G
1 PATCH TOPICAL DAILY
Status: DISCONTINUED | OUTPATIENT
Start: 2019-11-08 | End: 2019-11-08 | Stop reason: HOSPADM

## 2019-11-08 RX ORDER — NAPROXEN 500 MG/1
500 TABLET ORAL 2 TIMES DAILY WITH MEALS
Qty: 30 TABLET | Refills: 0 | Status: SHIPPED | OUTPATIENT
Start: 2019-11-08 | End: 2020-03-23 | Stop reason: SDUPTHER

## 2019-11-08 RX ADMIN — TIZANIDINE 4 MG: 4 TABLET ORAL at 14:34

## 2019-11-08 RX ADMIN — NAPROXEN 500 MG: 250 TABLET ORAL at 14:34

## 2019-11-08 ASSESSMENT — PAIN DESCRIPTION - ORIENTATION: ORIENTATION: RIGHT

## 2019-11-08 ASSESSMENT — PAIN SCALES - GENERAL
PAINLEVEL_OUTOF10: 7
PAINLEVEL_OUTOF10: 7

## 2019-11-08 ASSESSMENT — PAIN DESCRIPTION - PAIN TYPE: TYPE: ACUTE PAIN

## 2019-11-08 ASSESSMENT — ENCOUNTER SYMPTOMS
BACK PAIN: 0
VOMITING: 0
NAUSEA: 0
SHORTNESS OF BREATH: 0

## 2019-11-08 ASSESSMENT — PAIN DESCRIPTION - DESCRIPTORS: DESCRIPTORS: ACHING

## 2019-11-08 ASSESSMENT — PAIN DESCRIPTION - LOCATION: LOCATION: NECK

## 2019-11-11 ENCOUNTER — TELEPHONE (OUTPATIENT)
Dept: FAMILY MEDICINE CLINIC | Age: 59
End: 2019-11-11

## 2019-12-16 DIAGNOSIS — I10 ESSENTIAL HYPERTENSION: ICD-10-CM

## 2019-12-17 ENCOUNTER — TELEPHONE (OUTPATIENT)
Dept: FAMILY MEDICINE CLINIC | Age: 59
End: 2019-12-17

## 2019-12-17 RX ORDER — GLIPIZIDE 10 MG/1
TABLET, FILM COATED, EXTENDED RELEASE ORAL
Qty: 30 TABLET | Refills: 10 | Status: SHIPPED | OUTPATIENT
Start: 2019-12-17 | End: 2020-11-13

## 2019-12-17 RX ORDER — DULOXETIN HYDROCHLORIDE 30 MG/1
CAPSULE, DELAYED RELEASE ORAL
Qty: 30 CAPSULE | Refills: 10 | Status: SHIPPED | OUTPATIENT
Start: 2019-12-17 | End: 2019-12-24 | Stop reason: SDUPTHER

## 2019-12-17 RX ORDER — METOPROLOL TARTRATE 50 MG/1
TABLET, FILM COATED ORAL
Qty: 60 TABLET | Refills: 10 | Status: SHIPPED | OUTPATIENT
Start: 2019-12-17 | End: 2020-11-13

## 2019-12-19 DIAGNOSIS — I10 ESSENTIAL HYPERTENSION: ICD-10-CM

## 2019-12-19 RX ORDER — AMLODIPINE BESYLATE 10 MG/1
TABLET ORAL
Qty: 30 TABLET | Refills: 11 | Status: SHIPPED | OUTPATIENT
Start: 2019-12-19 | End: 2020-10-21

## 2019-12-24 ENCOUNTER — OFFICE VISIT (OUTPATIENT)
Dept: FAMILY MEDICINE CLINIC | Age: 59
End: 2019-12-24
Payer: MEDICARE

## 2019-12-24 VITALS
DIASTOLIC BLOOD PRESSURE: 78 MMHG | WEIGHT: 312.4 LBS | RESPIRATION RATE: 18 BRPM | BODY MASS INDEX: 42.37 KG/M2 | SYSTOLIC BLOOD PRESSURE: 118 MMHG | HEART RATE: 65 BPM | OXYGEN SATURATION: 95 %

## 2019-12-24 DIAGNOSIS — E11.42 DIABETIC POLYNEUROPATHY ASSOCIATED WITH TYPE 2 DIABETES MELLITUS (HCC): ICD-10-CM

## 2019-12-24 DIAGNOSIS — I10 ESSENTIAL HYPERTENSION: ICD-10-CM

## 2019-12-24 DIAGNOSIS — Z12.11 SCREEN FOR COLON CANCER: ICD-10-CM

## 2019-12-24 DIAGNOSIS — E78.5 HYPERLIPIDEMIA, UNSPECIFIED HYPERLIPIDEMIA TYPE: ICD-10-CM

## 2019-12-24 DIAGNOSIS — Z12.5 SCREENING FOR PROSTATE CANCER: ICD-10-CM

## 2019-12-24 DIAGNOSIS — E66.01 MORBID OBESITY (HCC): ICD-10-CM

## 2019-12-24 LAB
AVERAGE GLUCOSE: 163 MG/DL
HBA1C MFR BLD: 7.3 % (ref 4.4–6.4)

## 2019-12-24 PROCEDURE — G8599 NO ASA/ANTIPLAT THER USE RNG: HCPCS | Performed by: FAMILY MEDICINE

## 2019-12-24 PROCEDURE — 3017F COLORECTAL CA SCREEN DOC REV: CPT | Performed by: FAMILY MEDICINE

## 2019-12-24 PROCEDURE — 2022F DILAT RTA XM EVC RTNOPTHY: CPT | Performed by: FAMILY MEDICINE

## 2019-12-24 PROCEDURE — 99214 OFFICE O/P EST MOD 30 MIN: CPT | Performed by: FAMILY MEDICINE

## 2019-12-24 PROCEDURE — G8484 FLU IMMUNIZE NO ADMIN: HCPCS | Performed by: FAMILY MEDICINE

## 2019-12-24 PROCEDURE — G8427 DOCREV CUR MEDS BY ELIG CLIN: HCPCS | Performed by: FAMILY MEDICINE

## 2019-12-24 PROCEDURE — G8417 CALC BMI ABV UP PARAM F/U: HCPCS | Performed by: FAMILY MEDICINE

## 2019-12-24 PROCEDURE — 1036F TOBACCO NON-USER: CPT | Performed by: FAMILY MEDICINE

## 2019-12-24 RX ORDER — DULOXETIN HYDROCHLORIDE 30 MG/1
CAPSULE, DELAYED RELEASE ORAL
Qty: 30 CAPSULE | Refills: 11 | Status: SHIPPED | OUTPATIENT
Start: 2019-12-24 | End: 2020-11-13

## 2019-12-24 SDOH — ECONOMIC STABILITY: TRANSPORTATION INSECURITY
IN THE PAST 12 MONTHS, HAS THE LACK OF TRANSPORTATION KEPT YOU FROM MEDICAL APPOINTMENTS OR FROM GETTING MEDICATIONS?: NO

## 2019-12-24 SDOH — ECONOMIC STABILITY: TRANSPORTATION INSECURITY
IN THE PAST 12 MONTHS, HAS LACK OF TRANSPORTATION KEPT YOU FROM MEETINGS, WORK, OR FROM GETTING THINGS NEEDED FOR DAILY LIVING?: NO

## 2019-12-24 SDOH — ECONOMIC STABILITY: FOOD INSECURITY: WITHIN THE PAST 12 MONTHS, THE FOOD YOU BOUGHT JUST DIDN'T LAST AND YOU DIDN'T HAVE MONEY TO GET MORE.: NEVER TRUE

## 2019-12-24 SDOH — ECONOMIC STABILITY: INCOME INSECURITY: HOW HARD IS IT FOR YOU TO PAY FOR THE VERY BASICS LIKE FOOD, HOUSING, MEDICAL CARE, AND HEATING?: NOT HARD AT ALL

## 2019-12-24 SDOH — ECONOMIC STABILITY: FOOD INSECURITY: WITHIN THE PAST 12 MONTHS, YOU WORRIED THAT YOUR FOOD WOULD RUN OUT BEFORE YOU GOT MONEY TO BUY MORE.: NEVER TRUE

## 2019-12-24 ASSESSMENT — ENCOUNTER SYMPTOMS
GASTROINTESTINAL NEGATIVE: 1
RESPIRATORY NEGATIVE: 1

## 2020-02-24 ENCOUNTER — TELEPHONE (OUTPATIENT)
Dept: FAMILY MEDICINE CLINIC | Age: 60
End: 2020-02-24

## 2020-02-24 RX ORDER — TRAMADOL HYDROCHLORIDE 50 MG/1
50 TABLET ORAL EVERY 8 HOURS PRN
Qty: 15 TABLET | Refills: 0 | Status: SHIPPED | OUTPATIENT
Start: 2020-02-24 | End: 2020-02-29

## 2020-03-23 RX ORDER — NAPROXEN 500 MG/1
500 TABLET ORAL 2 TIMES DAILY WITH MEALS
Qty: 60 TABLET | Refills: 0 | Status: SHIPPED | OUTPATIENT
Start: 2020-03-23 | End: 2020-05-29

## 2020-05-12 RX ORDER — SITAGLIPTIN 100 MG/1
TABLET, FILM COATED ORAL
Qty: 90 TABLET | Refills: 3 | Status: SHIPPED | OUTPATIENT
Start: 2020-05-12 | End: 2020-06-05

## 2020-05-19 RX ORDER — PRAVASTATIN SODIUM 80 MG/1
TABLET ORAL
Qty: 30 TABLET | Refills: 10 | Status: SHIPPED | OUTPATIENT
Start: 2020-05-19 | End: 2021-04-26

## 2020-05-28 ENCOUNTER — TELEPHONE (OUTPATIENT)
Dept: FAMILY MEDICINE CLINIC | Age: 60
End: 2020-05-28

## 2020-05-29 ENCOUNTER — TELEMEDICINE (OUTPATIENT)
Dept: FAMILY MEDICINE CLINIC | Age: 60
End: 2020-05-29
Payer: MEDICARE

## 2020-05-29 PROCEDURE — 3017F COLORECTAL CA SCREEN DOC REV: CPT | Performed by: FAMILY MEDICINE

## 2020-05-29 PROCEDURE — G8428 CUR MEDS NOT DOCUMENT: HCPCS | Performed by: FAMILY MEDICINE

## 2020-05-29 PROCEDURE — 99213 OFFICE O/P EST LOW 20 MIN: CPT | Performed by: FAMILY MEDICINE

## 2020-05-29 RX ORDER — IBUPROFEN 600 MG/1
600 TABLET ORAL EVERY 8 HOURS PRN
Qty: 30 TABLET | Refills: 0 | Status: SHIPPED | OUTPATIENT
Start: 2020-05-29 | End: 2020-06-24 | Stop reason: SDUPTHER

## 2020-06-01 ASSESSMENT — ENCOUNTER SYMPTOMS
GASTROINTESTINAL NEGATIVE: 1
RESPIRATORY NEGATIVE: 1

## 2020-06-05 RX ORDER — SITAGLIPTIN 100 MG/1
TABLET, FILM COATED ORAL
Qty: 90 TABLET | Refills: 3 | Status: SHIPPED | OUTPATIENT
Start: 2020-06-05 | End: 2021-04-12

## 2020-06-24 ENCOUNTER — VIRTUAL VISIT (OUTPATIENT)
Dept: FAMILY MEDICINE CLINIC | Age: 60
End: 2020-06-24
Payer: MEDICARE

## 2020-06-24 PROCEDURE — G8428 CUR MEDS NOT DOCUMENT: HCPCS | Performed by: FAMILY MEDICINE

## 2020-06-24 PROCEDURE — 2022F DILAT RTA XM EVC RTNOPTHY: CPT | Performed by: FAMILY MEDICINE

## 2020-06-24 PROCEDURE — 99214 OFFICE O/P EST MOD 30 MIN: CPT | Performed by: FAMILY MEDICINE

## 2020-06-24 PROCEDURE — 3046F HEMOGLOBIN A1C LEVEL >9.0%: CPT | Performed by: FAMILY MEDICINE

## 2020-06-24 PROCEDURE — 3017F COLORECTAL CA SCREEN DOC REV: CPT | Performed by: FAMILY MEDICINE

## 2020-06-24 RX ORDER — IBUPROFEN 600 MG/1
600 TABLET ORAL EVERY 8 HOURS PRN
Qty: 60 TABLET | Refills: 0 | Status: SHIPPED | OUTPATIENT
Start: 2020-06-24

## 2020-06-24 ASSESSMENT — ENCOUNTER SYMPTOMS
RESPIRATORY NEGATIVE: 1
GASTROINTESTINAL NEGATIVE: 1

## 2020-06-24 NOTE — PROGRESS NOTES
LABA1C 7.3 (H) 12/23/2019     No results found for: EAG    No components found for: CHLPL  Lab Results   Component Value Date    TRIG 308 (H) 06/21/2019    TRIG 281 (H) 02/15/2018    TRIG 405 (H) 02/16/2017     Lab Results   Component Value Date    HDL 36 (L) 06/21/2019    HDL 37 (L) 02/15/2018    HDL 34 (L) 02/16/2017     Lab Results   Component Value Date    LDLCALC 62 06/21/2019    LDLCALC 66 02/15/2018    LDLCALC 67 06/25/2015     Lab Results   Component Value Date    LABVLDL 62 (H) 06/21/2019    LABVLDL 56 (H) 02/15/2018    LABVLDL 60 (H) 06/25/2015         Chemistry        Component Value Date/Time     06/21/2019 1017    K 4.4 06/21/2019 1017    CL 99 06/21/2019 1017    CO2 29 06/21/2019 1017    BUN 18 06/21/2019 1017    CREATININE 0.79 06/21/2019 1017        Component Value Date/Time    CALCIUM 9.1 06/21/2019 1017    ALKPHOS 25 (L) 06/21/2019 1017    ALKPHOS 24 (L) 10/09/2018 2021    AST 17 06/21/2019 1017    ALT 19 06/21/2019 1017    BILITOT 0.7 06/21/2019 1017            Lab Results   Component Value Date    TSH 2.49 06/21/2019       Lab Results   Component Value Date    WBC 9.0 04/26/2019    HGB 16.3 04/26/2019    HCT 47.7 04/26/2019    MCV 94.5 (H) 04/26/2019     04/26/2019         Health Maintenance   Topic Date Due    Shingles Vaccine (1 of 2) 01/02/2008    Diabetic retinal exam  03/18/2016    Diabetic microalbuminuria test  06/21/2020    Potassium monitoring  06/21/2020    Creatinine monitoring  06/21/2020    Lipid screen  06/21/2020    Flu vaccine (Season Ended) 12/24/2020 (Originally 9/1/2020)    A1C test (Diabetic or Prediabetic)  12/23/2020    Diabetic foot exam  12/24/2020    DTaP/Tdap/Td vaccine (2 - Tdap) 03/18/2025    Colon cancer screen colonoscopy  01/23/2030    Pneumococcal 0-64 years Vaccine  Completed    Hepatitis C screen  Completed    HIV screen  Completed    Hepatitis A vaccine  Aged Out    Hib vaccine  Aged Out    Meningococcal (ACWY) vaccine  Aged Out

## 2020-07-08 ENCOUNTER — TELEPHONE (OUTPATIENT)
Dept: FAMILY MEDICINE CLINIC | Age: 60
End: 2020-07-08

## 2020-07-09 NOTE — TELEPHONE ENCOUNTER
Received questions from [employer]. Please advise. Has the patient experienced an inadequate clinical response to no less than a 90 day trial of at least ONE preferred DPP-4 (dipeptidyl peptidase-4 inhibitor) and SGLT2 (sodium-glucose cotransporter 2 inhibitor) product in the past 120 days?  [Note: Inadequate clinical response is the inability to reach A1C goal after at least 90 days of recommended therapeutic dose with documented adherence to the regimen.]   YES NO

## 2020-07-13 NOTE — TELEPHONE ENCOUNTER
Attempted to call [facility] for inquire about the PA, kept losing the call before I could talk to anyone. Will cont to try.

## 2020-07-21 NOTE — TELEPHONE ENCOUNTER
Fax received from [employer] with list of approved step therapy alternatives. Fax placed in your bin for review.

## 2020-07-21 NOTE — TELEPHONE ENCOUNTER
Called and spoke with the patient and he stated that he is still taking the Steglarto 15mg. Called [facility] pharmacy and spoke with [facility], he confirmed that the medication needs a PA and that when he runs the claim it does not give him any covered options.

## 2020-07-22 RX ORDER — EMPAGLIFLOZIN 10 MG/1
1 TABLET, FILM COATED ORAL DAILY
Qty: 90 TABLET | Refills: 3 | Status: SHIPPED | OUTPATIENT
Start: 2020-07-22 | End: 2022-04-27

## 2020-07-22 NOTE — TELEPHONE ENCOUNTER
Called and updated the patient and his wife. They voiced understanding.   The patient will finish the [facility] and then start the [place]

## 2020-07-30 ENCOUNTER — TELEPHONE (OUTPATIENT)
Dept: FAMILY MEDICINE CLINIC | Age: 60
End: 2020-07-30

## 2020-07-30 NOTE — TELEPHONE ENCOUNTER
Patient with 2 day hx of severe itching with redness to bilateral lower legs. Pt states provider has treated this for him before. Asking if you will send in script to [facility]. Pt would like a call back with advice.

## 2020-07-31 ENCOUNTER — OFFICE VISIT (OUTPATIENT)
Dept: FAMILY MEDICINE CLINIC | Age: 60
End: 2020-07-31
Payer: MEDICARE

## 2020-07-31 ENCOUNTER — TELEPHONE (OUTPATIENT)
Dept: FAMILY MEDICINE CLINIC | Age: 60
End: 2020-07-31

## 2020-07-31 VITALS
SYSTOLIC BLOOD PRESSURE: 128 MMHG | WEIGHT: 312 LBS | TEMPERATURE: 96.2 F | BODY MASS INDEX: 42.31 KG/M2 | DIASTOLIC BLOOD PRESSURE: 78 MMHG | RESPIRATION RATE: 20 BRPM | HEART RATE: 100 BPM

## 2020-07-31 PROCEDURE — 3046F HEMOGLOBIN A1C LEVEL >9.0%: CPT | Performed by: FAMILY MEDICINE

## 2020-07-31 PROCEDURE — 1036F TOBACCO NON-USER: CPT | Performed by: FAMILY MEDICINE

## 2020-07-31 PROCEDURE — 10160 PNXR ASPIR ABSC HMTMA BULLA: CPT | Performed by: FAMILY MEDICINE

## 2020-07-31 PROCEDURE — G8417 CALC BMI ABV UP PARAM F/U: HCPCS | Performed by: FAMILY MEDICINE

## 2020-07-31 PROCEDURE — G8427 DOCREV CUR MEDS BY ELIG CLIN: HCPCS | Performed by: FAMILY MEDICINE

## 2020-07-31 PROCEDURE — 99214 OFFICE O/P EST MOD 30 MIN: CPT | Performed by: FAMILY MEDICINE

## 2020-07-31 PROCEDURE — 2022F DILAT RTA XM EVC RTNOPTHY: CPT | Performed by: FAMILY MEDICINE

## 2020-07-31 PROCEDURE — 3017F COLORECTAL CA SCREEN DOC REV: CPT | Performed by: FAMILY MEDICINE

## 2020-07-31 RX ORDER — BETAMETHASONE DIPROPIONATE 0.5 MG/G
CREAM TOPICAL
Qty: 50 G | Refills: 0 | Status: SHIPPED | OUTPATIENT
Start: 2020-07-31 | End: 2020-07-31

## 2020-07-31 ASSESSMENT — PATIENT HEALTH QUESTIONNAIRE - PHQ9
2. FEELING DOWN, DEPRESSED OR HOPELESS: 0
SUM OF ALL RESPONSES TO PHQ QUESTIONS 1-9: 0
SUM OF ALL RESPONSES TO PHQ QUESTIONS 1-9: 0
1. LITTLE INTEREST OR PLEASURE IN DOING THINGS: 0
SUM OF ALL RESPONSES TO PHQ9 QUESTIONS 1 & 2: 0

## 2020-07-31 NOTE — PROGRESS NOTES
Visit Information    Have you changed or started any medications since your last visit including any over-the-counter medicines, vitamins, or herbal medicines? no   Are you having any side effects from any of your medications? -  no  Have you stopped taking any of your medications? Is so, why? -  yes - tx complete    Have you seen any other physician or provider since your last visit? No  Have you had any other diagnostic tests since your last visit? No  Have you been seen in the emergency room and/or had an admission to a hospital since we last saw you? No  Have you had your routine dental cleaning in the past 6 months? no    Have you activated your [employer] account? If not, what are your barriers?  Yes     Patient Care Team:  Phil Burns DO as PCP - General (Family Medicine)  Phil Burns DO as PCP - [facility]  Denis Nix MD as Consulting Physician (Orthopedic Surgery)    Medical History Review  Past Medical, Family, and Social History reviewed and does not contribute to the patient presenting condition    Health Maintenance   Topic Date Due    Shingles Vaccine (1 of 2) 01/02/2008    Diabetic retinal exam  03/18/2016    Diabetic microalbuminuria test  06/21/2020    Lipid screen  06/21/2020    Potassium monitoring  06/21/2020    Creatinine monitoring  06/21/2020    Flu vaccine (1) 09/01/2020    A1C test (Diabetic or Prediabetic)  12/23/2020    Diabetic foot exam  12/24/2020    DTaP/Tdap/Td vaccine (2 - Tdap) 03/18/2025    Colon cancer screen colonoscopy  01/23/2030    Pneumococcal 0-64 years Vaccine  Completed    Hepatitis C screen  Completed    HIV screen  Completed    Hepatitis A vaccine  Aged Out    Hib vaccine  Aged Out    Meningococcal (ACWY) vaccine  Aged Out

## 2020-07-31 NOTE — TELEPHONE ENCOUNTER
PA request received from pharmacy for Augmented Betamethasone Cream.  Proceed with PA or alternative Rx.   Please advise

## 2020-08-03 ASSESSMENT — ENCOUNTER SYMPTOMS
GASTROINTESTINAL NEGATIVE: 1
RESPIRATORY NEGATIVE: 1

## 2020-08-03 NOTE — PROGRESS NOTES
Subjective:      Patient ID: Jaden Killian is a 56 y.o. male. HPI:    Chief Complaint   Patient presents with    Rash     itchy, burning rash on lower legs the past week and getting worse    Finger Pain     painful sore on right pointer finger     Pt presents today with a few concerns. 1st concern is itchy rash to bl shins for the last few weeks. Getting worse. No topical treatment to date. Also concerned today about a painful lesion to right pointer finger x 1 week. Tried squeezing it yesterday and got a lot of pus out of it. Patient Active Problem List   Diagnosis    HTN (hypertension)    Hyperlipidemia    GERD (gastroesophageal reflux disease)    ED (erectile dysfunction)    Morbid obesity ([address]    Non-occlusive coronary artery disease    Diabetes mellitus type II, controlled ([address]    Uncontrolled diabetes mellitus type 2 without complications ([address]    Controlled type 2 diabetes mellitus without complication, without long-term current use of insulin ([address]    Diabetic neuropathy ([address]     Past Surgical History:   Procedure Laterality Date    CARDIAC CATHETERIZATION      KNEE SURGERY Right 12/2017    VARICOSE VEIN SURGERY       Prior to Admission medications    Medication Sig Start Date End Date Taking? Authorizing Provider   mupirocin (BACTROBAN) 2 % ointment Apply 2 times daily.  7/31/20 8/7/20 Yes Patience Lord, DO   JANUVIA 100 MG tablet take 1 tablet by mouth every morning BEFORE BREAKFAST 6/5/20  Yes Patience Lord, DO   pravastatin (PRAVACHOL) 80 MG tablet TAKE 1 TABLET BY MOUTH ONCE DAILY 5/19/20  Yes Patience Lord, DO   valsartan-hydrochlorothiazide (DIOVAN-HCT) 320-12.5 MG per tablet TAKE (1) TABLET BY MOUTH ONCE DAILY 3/25/20  Yes Patience Lord DO   DULoxetine (CYMBALTA) 30 MG extended release capsule TAKE ONE (1) CAPSULE BY MOUTH ONCE DAILY 12/24/19  Yes Patience Lord, DO   amLODIPine (NORVASC) 10 MG tablet TAKE 1 TABLET BY MOUTH EVERY DAY 12/19/19  Yes Enrique Marti DO   metoprolol tartrate (LOPRESSOR) 50 MG tablet TAKE 1 TABLET BY MOUTH TWICE DAILY 12/17/19  Yes Enrique Marti DO   glipiZIDE (GLUCOTROL XL) 10 MG extended release tablet TAKE 1 TABLET BY MOUTH EVERY DAY 12/17/19  Yes Enrique Marti DO   metFORMIN (GLUCOPHAGE) 1000 MG tablet TAKE 1 TABLET BY MOUTH TWICE DAILY WITH FOOD 11/21/19  Yes Enrique Marti DO   aspirin 81 MG EC tablet Take 1 tablet by mouth daily 2/22/17  Yes Enrique Marti DO   FISH OIL Take  by mouth daily. Yes Historical Provider, MD   Multiple Vitamins-Minerals (CENTRUM PO) Take 1 tablet by mouth daily. Yes Historical Provider, MD   fluocinonide (LIDEX) 0.05 % cream Apply topically to legs 2 times daily prn. 7/31/20   Enrique Marti DO   empagliflozin (JARDIANCE) 10 MG tablet Take 1 tablet by mouth daily  Patient not taking: Reported on 7/31/2020 7/22/20   Enrique Marti DO   ibuprofen (ADVIL;MOTRIN) 600 MG tablet Take 1 tablet by mouth every 8 hours as needed for Pain  Patient not taking: Reported on 7/31/2020 6/24/20   Enrique Marti DO   diclofenac sodium (VOLTAREN) 1 % GEL Apply 2 g topically 4 times daily 5/29/20   Enrique Marti DO   tiZANidine (ZANAFLEX) 4 MG tablet Take 1 tablet by mouth every 6 hours as needed (muscle spasm) 11/8/19   Trudy Glassing Counts, APRN - CNP   albuterol sulfate HFA (VENTOLIN HFA) 108 (90 Base) MCG/ACT inhaler Inhale 2 puffs into the lungs every 4 hours as needed for Wheezing or Shortness of Breath  Patient not taking: Reported on 7/31/2020 7/1/19   Enrique Marti DO   nystatin (MYCOSTATIN) 822496 UNIT/GM cream Apply topically 2 times daily. Patient not taking: Reported on 7/31/2020 6/27/19   Enrique Marti DO   Insulin Pen Needle 31G X 5 MM MISC Use weekly with Trulicity  Patient not taking: Reported on 7/31/2020 8/29/18   Enrique Quarto, DO         Review of Systems   Constitutional: Negative. HENT: Negative. Respiratory: Negative. Cardiovascular: Negative. Gastrointestinal: Negative. Musculoskeletal: Positive for arthralgias (wound right index finger). Skin: Positive for rash (red, itchy rash to both shins). All other systems reviewed and are negative. Objective:   Physical Exam  Vitals signs and nursing note reviewed. Constitutional:       Appearance: He is well-developed. HENT:      Head: Normocephalic and atraumatic. Right Ear: Tympanic membrane normal.      Left Ear: Tympanic membrane normal.   Cardiovascular:      Rate and Rhythm: Normal rate and regular rhythm. Heart sounds: Normal heart sounds. No murmur. Pulmonary:      Effort: Pulmonary effort is normal.      Breath sounds: Normal breath sounds. Abdominal:      General: Bowel sounds are normal.      Palpations: Abdomen is soft. Skin:     General: Skin is warm and dry. Neurological:      Mental Status: He is alert and oriented to person, place, and time. Psychiatric:         Behavior: Behavior normal.                 Assessment:       Diagnosis Orders   1. Venous stasis dermatitis of both lower extremities     2. Lower leg edema     3. Leg hematoma, right, initial encounter     4. Herpetic gilda  24880 - CO PUNCTURE DRAINAGE OF LESION   5. Uncontrolled diabetes mellitus type 2 without complications ([facility]           Plan:      -  Chronic medical problems stable  -  Continue current medications  -  LE elevation, Lidex for #1  -  Pt also has a large hematoma vs varicose veins vs soft tissue mass on right leg  -  Recommend imaging for which he declines  -  Risks/benefits of procedure discussed, consent signed. Area of blister lanced and fluid expressed for comfort. Abx ointment and bandage applied.   Home instructions given  -  Gael Ormelissa, DO

## 2020-08-24 ENCOUNTER — TELEPHONE (OUTPATIENT)
Dept: FAMILY MEDICINE CLINIC | Age: 60
End: 2020-08-24

## 2020-08-25 NOTE — TELEPHONE ENCOUNTER
Called and spoke with the patient, he stated that his medications did come in the mail so he is no longer needing anything from the office.

## 2020-10-12 ENCOUNTER — OFFICE VISIT (OUTPATIENT)
Dept: CARDIOLOGY CLINIC | Age: 60
End: 2020-10-12
Payer: MEDICARE

## 2020-10-12 VITALS
HEIGHT: 73 IN | DIASTOLIC BLOOD PRESSURE: 90 MMHG | HEART RATE: 74 BPM | BODY MASS INDEX: 41.08 KG/M2 | WEIGHT: 310 LBS | SYSTOLIC BLOOD PRESSURE: 130 MMHG

## 2020-10-12 PROCEDURE — 1036F TOBACCO NON-USER: CPT | Performed by: INTERNAL MEDICINE

## 2020-10-12 PROCEDURE — G8417 CALC BMI ABV UP PARAM F/U: HCPCS | Performed by: INTERNAL MEDICINE

## 2020-10-12 PROCEDURE — 99213 OFFICE O/P EST LOW 20 MIN: CPT | Performed by: INTERNAL MEDICINE

## 2020-10-12 PROCEDURE — 3017F COLORECTAL CA SCREEN DOC REV: CPT | Performed by: INTERNAL MEDICINE

## 2020-10-12 PROCEDURE — G8427 DOCREV CUR MEDS BY ELIG CLIN: HCPCS | Performed by: INTERNAL MEDICINE

## 2020-10-12 PROCEDURE — G8484 FLU IMMUNIZE NO ADMIN: HCPCS | Performed by: INTERNAL MEDICINE

## 2020-10-12 PROCEDURE — 93000 ELECTROCARDIOGRAM COMPLETE: CPT | Performed by: INTERNAL MEDICINE

## 2020-10-12 NOTE — PROGRESS NOTES
[address] [place] [address],[place] [address] [address] [place] [address]  Dept: [phone]  Dept Fax: [phone]  Loc: [phone]    Visit Date: 10/12/2020    Mr. Valery Lemos is a 56 y.o. male  who presented for:  Chief Complaint   Patient presents with    1 Year Follow Up       HPI:   62year old morbidly obese male here for follow-up appointment. Patient followed with Dr. Chloé Pang in the past. PMHx significant for HTN, HLD, DM. Family Hx significant for CABG in both parents. Former smoker, since 2000. Reports decreased exercise tolerance. Denies chest pain, palpitations, lightheadedness, dizziness, LE edema, orthopnea, PND, LE pain on walking. He has been having increased shortness of breath. States off and on for at least 4 months. Worse with exertion, better with rest. No leg swelling.               Current Outpatient Medications:     fluocinonide (LIDEX) 0.05 % cream, Apply topically to legs 2 times daily prn., Disp: 60 g, Rfl: 1    empagliflozin (JARDIANCE) 10 MG tablet, Take 1 tablet by mouth daily, Disp: 90 tablet, Rfl: 3    ibuprofen (ADVIL;MOTRIN) 600 MG tablet, Take 1 tablet by mouth every 8 hours as needed for Pain, Disp: 60 tablet, Rfl: 0    JANUVIA 100 MG tablet, take 1 tablet by mouth every morning BEFORE BREAKFAST, Disp: 90 tablet, Rfl: 3    diclofenac sodium (VOLTAREN) 1 % GEL, Apply 2 g topically 4 times daily, Disp: 5 Tube, Rfl: 0    pravastatin (PRAVACHOL) 80 MG tablet, TAKE 1 TABLET BY MOUTH ONCE DAILY, Disp: 30 tablet, Rfl: 10    valsartan-hydrochlorothiazide (DIOVAN-HCT) 320-12.5 MG per tablet, TAKE (1) TABLET BY MOUTH ONCE DAILY, Disp: 90 tablet, Rfl: 3    DULoxetine (CYMBALTA) 30 MG extended release capsule, TAKE ONE (1) CAPSULE BY MOUTH ONCE DAILY, Disp: 30 capsule, Rfl: 11    amLODIPine (NORVASC) 10 MG tablet, TAKE 1 TABLET BY MOUTH EVERY DAY, Disp: 30 tablet, Rfl: 11    metoprolol tartrate (LOPRESSOR) 50 MG tablet, TAKE 1 TABLET BY MOUTH TWICE DAILY, Disp: 60 tablet, Rfl: 10    glipiZIDE (GLUCOTROL XL) 10 MG extended release tablet, TAKE 1 TABLET BY MOUTH EVERY DAY, Disp: 30 tablet, Rfl: 10    metFORMIN (GLUCOPHAGE) 1000 MG tablet, TAKE 1 TABLET BY MOUTH TWICE DAILY WITH FOOD, Disp: 60 tablet, Rfl: 11    tiZANidine (ZANAFLEX) 4 MG tablet, Take 1 tablet by mouth every 6 hours as needed (muscle spasm), Disp: 20 tablet, Rfl: 0    albuterol sulfate HFA (VENTOLIN HFA) 108 (90 Base) MCG/ACT inhaler, Inhale 2 puffs into the lungs every 4 hours as needed for Wheezing or Shortness of Breath, Disp: 1 Inhaler, Rfl: 5    nystatin (MYCOSTATIN) 149815 UNIT/GM cream, Apply topically 2 times daily. , Disp: 60 g, Rfl: 2    Insulin Pen Needle 31G X 5 MM MISC, Use weekly with Trulicity, Disp: 607 each, Rfl: 0    aspirin 81 MG EC tablet, Take 1 tablet by mouth daily, Disp: 90 tablet, Rfl: 3    FISH OIL, Take  by mouth daily. , Disp: , Rfl:     Multiple Vitamins-Minerals (CENTRUM PO), Take 1 tablet by mouth daily. , Disp: , Rfl:     Past Medical History  Lashanda Christianson  has a past medical history of Diabetes mellitus ([facility] [address] ED (erectile dysfunction), Hyperlipidemia, Hypertension, Type 2 diabetes mellitus without complication ([facility] [address] and Type II or unspecified type diabetes mellitus without mention of complication, not stated as uncontrolled. Social History  Lashanda Christianson  reports that he quit smoking about 8 years ago. His smoking use included cigarettes. He has a 20.00 pack-year smoking history. He has never used smokeless tobacco. He reports current alcohol use of about 1.0 standard drinks of alcohol per week. He reports that he does not use drugs. Family History  Lashanda Christianson family history includes Cancer in his father; Heart Disease in his mother.     Past Surgical History   Past Surgical History:   Procedure Laterality Date    CARDIAC CATHETERIZATION      KNEE SURGERY Right 12/2017    VARICOSE VEIN SURGERY         Subjective:     REVIEW OF SYSTEMS  Constitutional: denies sweats, chills and fever  HENT: denies  congestion, sinus pressure, sneezing and sore throat. Eyes: denies  pain, discharge, redness and itching. Respiratory: denies apnea, cough  Gastrointestinal: denies blood in stool, constipation, diarrhea   Endocrine: denies cold intolerance, heat intolerance, polydipsia. Genitourinary: denies dysuria, enuresis, flank pain and hematuria. Musculoskeletal: denies arthralgias, joint swelling and neck pain. Neurological: denies numbness and headaches. Psychiatric/Behavioral: denies agitation, confusion, decreased concentration and dysphoric mood    All others reviewed and are negative. Objective:     BP (!) 130/90   Pulse 74   Ht 6' 1\" (1.854 m)   Wt (!) 310 lb (140.6 kg)   BMI 40.90 kg/m²     Wt Readings from Last 3 Encounters:   10/12/20 (!) 310 lb (140.6 kg)   07/31/20 (!) 312 lb (141.5 kg)   12/24/19 (!) 312 lb 6.4 oz (141.7 kg)     BP Readings from Last 3 Encounters:   10/12/20 (!) 130/90   07/31/20 128/78   12/24/19 118/78       PHYSICAL EXAM  Constitutional: Oriented to person, place, and time. Appears well-developed and well-nourished. HENT:   Head: Normocephalic and atraumatic. Eyes: EOM are normal. Pupils are equal, round, and reactive to light. Neck: Normal range of motion. Neck supple. No JVD present. Cardiovascular: Normal rate , normal heart sounds and intact distal pulses. Pulmonary/Chest: Effort normal and breath sounds normal. No respiratory distress. No wheezes. No rales. Abdominal: Soft. Bowel sounds are normal. No distension. There is no tenderness. Musculoskeletal: Normal range of motion. No edema. Neurological: Alert and oriented to person, place, and time. No cranial nerve deficit. Coordination normal.   Skin: Skin is warm and dry. Psychiatric: Normal mood and affect.        No results found for: CKTOTAL, CKMB, CKMBINDEX    Lab Results   Component Value Date    WBC 9.0 04/26/2019    RBC 5.05 04/26/2019    HGB 16.3 04/26/2019    HCT 47.7 04/26/2019    MCV 94.5 04/26/2019    MCH 32.3 04/26/2019    MCHC 34.2 04/26/2019    RDW 12.9 10/09/2018     04/26/2019    MPV 8.8 04/26/2019       Lab Results   Component Value Date     06/21/2019    K 4.4 06/21/2019    CL 99 06/21/2019    CO2 29 06/21/2019    BUN 18 06/21/2019    LABALBU 4.2 06/21/2019    CREATININE 0.79 06/21/2019    CALCIUM 9.1 06/21/2019    LABGLOM 86 04/26/2019    GLUCOSE 167 06/21/2019       Lab Results   Component Value Date    ALKPHOS 25 06/21/2019    ALKPHOS 24 10/09/2018    ALT 19 06/21/2019    AST 17 06/21/2019    PROT 7.0 06/21/2019    BILITOT 0.7 06/21/2019    LABALBU 4.2 06/21/2019       No results found for: MG    Lab Results   Component Value Date    INR 0.78 (L) 10/23/2012         Lab Results   Component Value Date    LABA1C 7.3 12/23/2019       Lab Results   Component Value Date    TRIG 308 06/21/2019    HDL 36 06/21/2019    HDL 35 06/06/2012    LDLCALC 62 06/21/2019    LDLDIRECT 149 08/16/2017    LABVLDL 62 06/21/2019       Lab Results   Component Value Date    TSH 2.49 06/21/2019         Testing Reviewed:      I haveindividually reviewed the below cardiac tests    EKG:     ECHO: echo 9/21/17 ef at 45-50%    Results for orders placed during the hospital encounter of 09/21/17   ECHO Complete 2D W Doppler W Color    Narrative Transthoracic Echocardiography Report (TTE)     Demographics      Patient Name    Iris Soares  Gender               Male                   L      MR #            [id number]       Race                                                       Ethnicity      Account #       [de-identified]       Room Number      Accession       [id number]       Date of Study        09/21/2017   Number      Date of Birth   1960      Referring Physician  Liliana VALENTIN DO      Age             59 year(s)      Sharita Carcamo Mela Garay MD                                   Physician     Procedure    Type of Study      TTE procedure:ECHOCARDIOGRAM COMPLETE 2D W DOPPLER W COLOR. Procedure Date  Date: 09/21/2017 Start: 07:42 AM    Study Location: Echo Lab  Technical Quality: Poor visualization due to body habitus. Indications:Abnormal ECG. Additional Medical History:Hypertension, hyperlipidemia, GERD, morbid  obesity, diabetic, remote smoker    Patient Status: Routine    Height: 72 inches Weight: 325.01 pounds BSA: 2.62 m^2 BMI: 44.08 kg/m^2    BP: 132/80 mmHg     Conclusions      Summary   Normal left ventricle size. Unable to determine wall motion abnormalities due to poor image quality. Ejection fraction was estimated at 45-50%. Doppler parameters were consistent with abnormal left ventricular   relaxation (grade 1 diastolic dysfunction). Technically difficult examination. Signature      ----------------------------------------------------------------   Electronically signed by Gita Matson MD (Interpreting   physician) on 09/22/2017 at 03:11 PM   ----------------------------------------------------------------      Findings      Mitral Valve   The mitral valve structure was normal with normal leaflet separation. DOPPLER: The transmitral velocity was within the normal range with no   evidence for mitral stenosis. There was trivial mitral regurgitation. Aortic Valve   The aortic valve leaflets were not well visualized. DOPPLER: Transaortic velocity was within the normal range with no evidence   of aortic stenosis. There was no evidence of aortic regurgitation. Tricuspid Valve   The tricuspid valve structure was normal with normal leaflet separation. DOPPLER: There was no evidence of tricuspid stenosis. There was trivial   tricuspid regurgitation. Pulmonic Valve   The pulmonic valve was not well visualized . Left Atrium   Left atrial size was normal.      Left Ventricle   Normal left ventricle size. Unable to determine wall motion abnormalities due to poor image quality. Ejection fraction was estimated at 45-50%. Doppler parameters were consistent with abnormal left ventricular   relaxation (grade 1 diastolic dysfunction). Right Atrium   Right atrial size was normal.      Right Ventricle   The right ventricular size was normal.      Pericardial Effusion   Fat pad present. Aorta / Great Vessels   Aortic root dimension within normal limits.      M-Mode/2D Measurements & Calculations      LV Diastolic    LV Systolic Dimension: 4 cm AV Cusp Separation: 2.3 cmLA   Dimension: 5.3  LV Volume Diastolic: 875 ml Dimension: 3.9 cmAO Root   cm              LV Volume Systolic: 70 ml   Dimension: 3.7 cm   LV FS:24.5 %    LV EDV/LV EDV Index: 135   LV PW           ml/52 m^2LV ESV/LV ESV   Diastolic: 0.9  Index: 70 TS/55 m^2   cm              EF Calculated: 48.2 %       RV Diastolic Dimension: 2.8 cm   Septum   Diastolic: 1 cm                             LA/Aorta: 1.05     Doppler Measurements & Calculations      MV Peak E-Wave: 58.2 cm/s  AV Peak Velocity: 109 LVOT Peak Velocity: 93.8   MV Peak A-Wave: 76 cm/s    cm/s                  cm/s   MV E/A Ratio: 0.77         AV Peak Gradient:     LVOT Peak Gradient: 4   MV Peak Gradient: 1.35     4.75 mmHg             mmHg   mmHg                                                    TV Peak E-Wave: 44.4 cm/s   MV Deceleration Time: 229                        TV Peak A-Wave: 43.9 cm/s   msec                                                    TV Peak Gradient: 0.79                                                    mmHg   MV E' Septal Velocity:                           TR Velocity:202 cm/s   5.95 cm/s                  AV DVI (Vmax):0.86    TR Gradient:16.32 mmHg   MV A' Septal Velocity:                           PV Peak Velocity: 51.8   8.58 cm/s cm/s   MV E' Lateral Velocity:                          PV Peak Gradient: 1.07   5.95 cm/s                                        mmHg   MV A' Lateral Velocity:   7.8 cm/s   E/E' septal: 9.78   E/E' lateral: 9.78     [email or web address] [id number]       STRESS:  9/2017 -    Lexiscan EKG stress test is not suggestive for ischemia. This Nuclear Medicine study was not conclusive due to significant artifact. CATH:  10/2012 -  Mr. Aisha Love underwent cardiac catheterization for an abnormal  stress test and several cardiovascular risk factors and he does not have obstructive coronary disease. Risk modification and medical management is recommended. Assessment/Plan       Diagnosis Orders   1. Shortness of breath  EKG 12 Lead    Echo 2D w doppler w color complete     Shortness of breath  HTN  HLD  DM, A1c 7.3%  Triglycerides high  Microalbuminuria    Reviewed prior workup  Will get Echo  On Aspirin, statin, Diovan-HCT  Patient is scheduled for blood work  Continue amloidpine   Weight loss advised  The patient is asked to make an attempt to improve diet and exercise patterns to aid in medical management of this problem. Advised more plant based nutrition/meditarrean diet   Advised patient to call office or seek immediate medical attention if there is any new onset of  any chest pain, sob, palpitations, lightheadedness, dizziness, orthopnea, PND or pedal edema. All medication side effects were discussed in details. Thank youfor allowing me to participate in the care of this patient. Please do not hesitate to contact me for any further questions. Return in about 6 months (around 4/12/2021) for Regular follow up, Review testing.        Electronically signed by Farnaz Fernandes MD   10/12/2020 at 12:04 PM

## 2020-10-13 ENCOUNTER — TELEPHONE (OUTPATIENT)
Dept: CARDIOLOGY CLINIC | Age: 60
End: 2020-10-13

## 2020-10-13 NOTE — TELEPHONE ENCOUNTER
Patient is scheduled for an Echo on 10.19.2020 at 230pm   Spoke with the patients wife Angélica Sabula

## 2020-10-21 RX ORDER — AMLODIPINE BESYLATE 10 MG/1
TABLET ORAL
Qty: 30 TABLET | Refills: 11 | Status: SHIPPED | OUTPATIENT
Start: 2020-10-21 | End: 2021-10-15

## 2020-10-29 ENCOUNTER — HOSPITAL ENCOUNTER (OUTPATIENT)
Dept: NON INVASIVE DIAGNOSTICS | Age: 60
Discharge: HOME | End: 2020-10-29
Payer: MEDICARE

## 2020-10-29 LAB
LV EF: 48 %
LVEF MODALITY: NORMAL

## 2020-10-29 PROCEDURE — 93306 TTE W/DOPPLER COMPLETE: CPT

## 2020-11-13 RX ORDER — METOPROLOL TARTRATE 50 MG/1
TABLET, FILM COATED ORAL
Qty: 60 TABLET | Refills: 10 | Status: SHIPPED | OUTPATIENT
Start: 2020-11-13 | End: 2021-10-15

## 2020-11-13 RX ORDER — GLIPIZIDE 10 MG/1
TABLET, FILM COATED, EXTENDED RELEASE ORAL
Qty: 30 TABLET | Refills: 10 | Status: SHIPPED | OUTPATIENT
Start: 2020-11-13 | End: 2021-10-15

## 2020-11-13 RX ORDER — DULOXETIN HYDROCHLORIDE 30 MG/1
CAPSULE, DELAYED RELEASE ORAL
Qty: 30 CAPSULE | Refills: 10 | Status: SHIPPED | OUTPATIENT
Start: 2020-11-13 | End: 2021-10-15

## 2020-12-01 ENCOUNTER — VIRTUAL VISIT (OUTPATIENT)
Dept: FAMILY MEDICINE CLINIC | Age: 60
End: 2020-12-01
Payer: MEDICARE

## 2020-12-01 PROCEDURE — 2022F DILAT RTA XM EVC RTNOPTHY: CPT | Performed by: NURSE PRACTITIONER

## 2020-12-01 PROCEDURE — 3017F COLORECTAL CA SCREEN DOC REV: CPT | Performed by: NURSE PRACTITIONER

## 2020-12-01 PROCEDURE — G8428 CUR MEDS NOT DOCUMENT: HCPCS | Performed by: NURSE PRACTITIONER

## 2020-12-01 PROCEDURE — 99213 OFFICE O/P EST LOW 20 MIN: CPT | Performed by: NURSE PRACTITIONER

## 2020-12-01 PROCEDURE — 3046F HEMOGLOBIN A1C LEVEL >9.0%: CPT | Performed by: NURSE PRACTITIONER

## 2020-12-01 RX ORDER — ALBUTEROL SULFATE 90 UG/1
2 AEROSOL, METERED RESPIRATORY (INHALATION) EVERY 4 HOURS PRN
Qty: 1 INHALER | Refills: 5 | Status: SHIPPED | OUTPATIENT
Start: 2020-12-01 | End: 2021-08-24 | Stop reason: SDUPTHER

## 2020-12-01 ASSESSMENT — ENCOUNTER SYMPTOMS
SINUS PAIN: 0
SORE THROAT: 1
CHEST TIGHTNESS: 0
SHORTNESS OF BREATH: 0
ABDOMINAL PAIN: 0
NAUSEA: 0
COUGH: 1
SINUS PRESSURE: 0
RHINORRHEA: 0

## 2020-12-01 NOTE — PROGRESS NOTES
Subjective:      Patient ID: Isidro Charles is a 56 y.o. male    HPI: Acute for cough    TELEHEALTH EVALUATION -- Audio/Visual (During NMBPK-31 public health emergency)    Services were provided through a video synchronous discussion virtually to substitute for in-person clinic visit. Patient and provider were located at their individual homes. Patient has requested an audio/video evaluation for the following concern(s):    Chief Complaint   Patient presents with    Fever       Onset of 1 days. Fever and chills. No body aches. ST.  Coughing. Cough that is dry - previous was bring up congestion. Denies CP, SOB or chest tightness    Has not been out of house due to pending surgery on knee at end of month 2020. Unaware of any COVID exposures. Son is sick who lives with patient started in with symptoms yesterday as well. Patient Active Problem List   Diagnosis    HTN (hypertension)    Hyperlipidemia    GERD (gastroesophageal reflux disease)    ED (erectile dysfunction)    Morbid obesity ([address]    Non-occlusive coronary artery disease    Diabetes mellitus type II, controlled ([address]    Uncontrolled diabetes mellitus type 2 without complications    Controlled type 2 diabetes mellitus without complication, without long-term current use of insulin (HCC)    Diabetic neuropathy ([address]       Review of Systems   Constitutional: Positive for chills, fatigue and fever. HENT: Positive for congestion, postnasal drip and sore throat. Negative for rhinorrhea, sinus pressure and sinus pain. Respiratory: Positive for cough. Negative for chest tightness and shortness of breath. Cardiovascular: Negative for chest pain. Gastrointestinal: Negative for abdominal pain and nausea. Skin: Negative for rash. Neurological: Negative for dizziness, light-headedness and headaches. Psychiatric/Behavioral: Negative.         Objective:   Physical Exam  Constitutional:       General: He is not in acute distress. Appearance: He is not ill-appearing. Pulmonary:      Effort: Pulmonary effort is normal. No respiratory distress. Neurological:      Mental Status: He is alert and oriented to person, place, and time. Psychiatric:         Mood and Affect: Mood normal.         Behavior: Behavior normal.         Assessment:       Diagnosis Orders   1. Fever and chills  Covid-19 Ambulatory   2. Cough  Covid-19 Ambulatory    albuterol sulfate HFA (VENTOLIN HFA) 108 (90 Base) MCG/ACT inhaler   3. Sore throat  Covid-19 Ambulatory   4. Morbid obesity ([address]     5. Controlled type 2 diabetes mellitus without complication, without long-term current use of insulin ([facility] [address]         Plan:   COVID-19 test due to high risk for complication if have  Viral nature of symptoms discussed  Symptomatic Care - Albuterol PRN  Increase fluids and rest  RTO if symptoms worsen or stay the same        Due to this being a TeleHealth encounter (During PJFSO-00 public health emergency), evaluation of the following organ systems was limited: Vitals/Constitutional/EENT/Resp/CV/GI//MS/Neuro/Skin/Heme-Lymph-Imm. Pursuant to the emergency declaration under the [facility], [address] [place] [address] authority and the [employer] and [employer] Act, this Virtual Visit was conducted with patient's (and/or legal guardian's) consent, to reduce the patient's risk of exposure to COVID-19 and provide necessary medical care. The patient (and/or legal guardian) has also been advised to contact this office for worsening conditions or problems, and seek emergency medical treatment and/or call 911 if deemed necessary.       [unfilled]

## 2020-12-02 ENCOUNTER — TELEPHONE (OUTPATIENT)
Dept: CARDIOLOGY CLINIC | Age: 60
End: 2020-12-02

## 2020-12-02 NOTE — TELEPHONE ENCOUNTER
Pre op Risk Assessment     Procedure Right Knee Arthroscopy  Physician Dr. Aki Dominguez  Date of surgery/procedure 12-31-20    Last OV 10-12-20  Last Stress 9-21-17  Last Echo 10-29-20  Last Cath 10-23-12  Last Stent None In Epic  Is patient on blood thinners ASA  Hold Meds/how many days ?     Fax- [phone]

## 2020-12-03 LAB — SARS-COV-2: DETECTED

## 2020-12-03 NOTE — TELEPHONE ENCOUNTER
Proceed with surgery at moderate risk   May hold Aspirin for 5 days and resume soon after the surgery

## 2020-12-08 ENCOUNTER — TELEPHONE (OUTPATIENT)
Dept: FAMILY MEDICINE CLINIC | Age: 60
End: 2020-12-08

## 2020-12-08 NOTE — TELEPHONE ENCOUNTER
[employer] letter received, PA was closed thy have a paid claim for Glipizide ER 10 mg tablets. Office will disregard PA at this time.

## 2020-12-08 NOTE — TELEPHONE ENCOUNTER
[facility] pharmacy fax received, PA required on Glipizide ER 10 mg tablets. Cover my meds PA submitted.

## 2020-12-21 ENCOUNTER — VIRTUAL VISIT (OUTPATIENT)
Dept: FAMILY MEDICINE CLINIC | Age: 60
End: 2020-12-21
Payer: MEDICARE

## 2020-12-21 PROCEDURE — G8428 CUR MEDS NOT DOCUMENT: HCPCS | Performed by: FAMILY MEDICINE

## 2020-12-21 PROCEDURE — 3017F COLORECTAL CA SCREEN DOC REV: CPT | Performed by: FAMILY MEDICINE

## 2020-12-21 PROCEDURE — 3046F HEMOGLOBIN A1C LEVEL >9.0%: CPT | Performed by: FAMILY MEDICINE

## 2020-12-21 PROCEDURE — 99214 OFFICE O/P EST MOD 30 MIN: CPT | Performed by: FAMILY MEDICINE

## 2020-12-21 PROCEDURE — 2022F DILAT RTA XM EVC RTNOPTHY: CPT | Performed by: FAMILY MEDICINE

## 2020-12-22 VITALS
WEIGHT: 311 LBS | DIASTOLIC BLOOD PRESSURE: 85 MMHG | SYSTOLIC BLOOD PRESSURE: 126 MMHG | HEART RATE: 70 BPM | BODY MASS INDEX: 41.03 KG/M2

## 2020-12-22 ASSESSMENT — ENCOUNTER SYMPTOMS
GASTROINTESTINAL NEGATIVE: 1
RESPIRATORY NEGATIVE: 1

## 2020-12-22 NOTE — PROGRESS NOTES
Subjective:      Patient ID: Magda Kincaid is a 56 y.o. male. HPI:    Chief Complaint   Patient presents with    [address] (:  1960) has requested an audio/video evaluation for the following concern(s):    6 month eval.  Doing well. Pt is not checking his sugars on a regular basis, due for repeat labs. Lab Results   Component Value Date    LABA1C 7.3 (H) 2019    LABA1C 8.2 (H) 2019    LABA1C 7.9 (H) 2018     Lab Results   Component Value Date    LDLCALC 62 2019    CREATININE 0.79 2019     Per pt, BP and weight stable. BP Readings from Last 3 Encounters:   20 126/85   10/12/20 (!) 130/90   20 128/78     Wt Readings from Last 3 Encounters:   20 (!) 311 lb (141.1 kg)   10/12/20 (!) 310 lb (140.6 kg)   20 (!) 312 lb (141.5 kg)     No acute concerns for me today. Patient Active Problem List   Diagnosis    HTN (hypertension)    Hyperlipidemia    GERD (gastroesophageal reflux disease)    ED (erectile dysfunction)    Morbid obesity ([address]    Non-occlusive coronary artery disease    Diabetes mellitus type II, controlled ([address]    Uncontrolled diabetes mellitus type 2 without complications    Controlled type 2 diabetes mellitus without complication, without long-term current use of insulin ([address]    Diabetic neuropathy ([address]     Past Surgical History:   Procedure Laterality Date    CARDIAC CATHETERIZATION      KNEE SURGERY Right 2017    VARICOSE VEIN SURGERY       Prior to Admission medications    Medication Sig Start Date End Date Taking?  Authorizing Provider   albuterol sulfate HFA (VENTOLIN HFA) 108 (90 Base) MCG/ACT inhaler Inhale 2 puffs into the lungs every 4 hours as needed for Wheezing or Shortness of Breath 20   RADAMES Montanez CNP   metoprolol tartrate (LOPRESSOR) 50 MG tablet TAKE 1 TABLET BY MOUTH TWICE DAILY 20   Ross Edwards DO metFORMIN (GLUCOPHAGE) 1000 MG tablet TAKE ONE (1) TABLET BY MOUTH TWICE DAILY WITH FOOD 11/13/20   Marlin Katey, DO   glipiZIDE (GLUCOTROL XL) 10 MG extended release tablet TAKE 1 TABLET BY MOUTH EVERY DAY 11/13/20   Marlin Katey, DO   DULoxetine (CYMBALTA) 30 MG extended release capsule TAKE 1 CAPSULE BY MOUTH ONCE DAILY 11/13/20   Marlin Katey, DO   amLODIPine (NORVASC) 10 MG tablet TAKE 1 TABLET BY MOUTH EVERY DAY 10/21/20   Marlin Katey, DO   fluocinonide (LIDEX) 0.05 % cream Apply topically to legs 2 times daily prn. 7/31/20   Marlin Katey, DO   empagliflozin (JARDIANCE) 10 MG tablet Take 1 tablet by mouth daily 7/22/20   Marlin Katey, DO   ibuprofen (ADVIL;MOTRIN) 600 MG tablet Take 1 tablet by mouth every 8 hours as needed for Pain 6/24/20   Marlin Katey, DO   JANUVIA 100 MG tablet take 1 tablet by mouth every morning BEFORE BREAKFAST 6/5/20   Marlin Kitty, DO   diclofenac sodium (VOLTAREN) 1 % GEL Apply 2 g topically 4 times daily 5/29/20   Marlin Katey, DO   pravastatin (PRAVACHOL) 80 MG tablet TAKE 1 TABLET BY MOUTH ONCE DAILY 5/19/20   Marlin Katey, DO   valsartan-hydrochlorothiazide (DIOVAN-HCT) 320-12.5 MG per tablet TAKE (1) TABLET BY MOUTH ONCE DAILY 3/25/20   Marlin Katey, DO   tiZANidine (ZANAFLEX) 4 MG tablet Take 1 tablet by mouth every 6 hours as needed (muscle spasm) 11/8/19   Chapis Math Counts, APRN - CNP   nystatin (MYCOSTATIN) 685900 UNIT/GM cream Apply topically 2 times daily. 6/27/19   Marlin Del Toro, DO   Insulin Pen Needle 31G X 5 MM MISC Use weekly with Trulicity 8/01/55   Marlin Katey, DO   aspirin 81 MG EC tablet Take 1 tablet by mouth daily 2/22/17   Marlin Katey, DO   FISH OIL Take  by mouth daily. Historical Provider, MD   Multiple Vitamins-Minerals (CENTRUM PO) Take 1 tablet by mouth daily.       Historical Provider, MD       Lab Results   Component Value Date LABA1C 7.3 (H) 12/23/2019     No results found for: EAG    No components found for: CHLPL  Lab Results   Component Value Date    TRIG 308 (H) 06/21/2019    TRIG 281 (H) 02/15/2018    TRIG 405 (H) 02/16/2017     Lab Results   Component Value Date    HDL 36 (L) 06/21/2019    HDL 37 (L) 02/15/2018    HDL 34 (L) 02/16/2017     Lab Results   Component Value Date    LDLCALC 62 06/21/2019    LDLCALC 66 02/15/2018    LDLCALC 67 06/25/2015     Lab Results   Component Value Date    LABVLDL 62 (H) 06/21/2019    LABVLDL 56 (H) 02/15/2018    LABVLDL 60 (H) 06/25/2015         Chemistry        Component Value Date/Time     06/21/2019 1017    K 4.4 06/21/2019 1017    CL 99 06/21/2019 1017    CO2 29 06/21/2019 1017    BUN 18 06/21/2019 1017    CREATININE 0.79 06/21/2019 1017        Component Value Date/Time    CALCIUM 9.1 06/21/2019 1017    ALKPHOS 25 (L) 06/21/2019 1017    ALKPHOS 24 (L) 10/09/2018 2021    AST 17 06/21/2019 1017    ALT 19 06/21/2019 1017    BILITOT 0.7 06/21/2019 1017            Lab Results   Component Value Date    TSH 2.49 06/21/2019       Lab Results   Component Value Date    WBC 9.0 04/26/2019    HGB 16.3 04/26/2019    HCT 47.7 04/26/2019    MCV 94.5 (H) 04/26/2019     04/26/2019         Health Maintenance   Topic Date Due    Shingles Vaccine (1 of 2) 01/02/2008    Diabetic retinal exam  03/18/2016    Diabetic microalbuminuria test  06/21/2020    Lipid screen  06/21/2020    Potassium monitoring  06/21/2020    Creatinine monitoring  06/21/2020    Flu vaccine (1) 09/01/2020    A1C test (Diabetic or Prediabetic)  12/23/2020    Diabetic foot exam  12/24/2020    DTaP/Tdap/Td vaccine (2 - Td) 03/18/2025    Colon cancer screen colonoscopy  01/23/2030    Pneumococcal 0-64 years Vaccine  Completed    Hepatitis C screen  Completed    HIV screen  Completed    Hepatitis A vaccine  Aged Out    Hib vaccine  Aged Out    Meningococcal (ACWY) vaccine  Aged [employer] History Administered Date(s) Administered    DTaP 03/18/2015    Pneumococcal Polysaccharide ([id number]) 08/24/2017    Tdap (Boostrix, Adacel) 03/18/2015           Review of Systems   Constitutional: Negative. HENT: Negative. Respiratory: Negative. Cardiovascular: Negative. Gastrointestinal: Negative. Musculoskeletal: Negative. All other systems reviewed and are negative. Objective:   Physical Exam  Constitutional:       General: He is not in acute distress. Appearance: Normal appearance. He is well-developed. He is not ill-appearing. HENT:      Head: Normocephalic and atraumatic. Right Ear: External ear normal.      Left Ear: External ear normal.   Eyes:      Conjunctiva/sclera: Conjunctivae normal.   Pulmonary:      Effort: Pulmonary effort is normal. No respiratory distress. Skin:     Findings: No rash (on exposed surfaces). Neurological:      Mental Status: He is alert and oriented to person, place, and time. Psychiatric:         Mood and Affect: Mood normal.         Behavior: Behavior normal.         Thought Content: Thought content normal.         Judgment: Judgment normal.         Assessment:       Diagnosis Orders   1. Morbid obesity ([facility]     2. Diabetic polyneuropathy associated with type 2 diabetes mellitus ([address]     3. Essential hypertension  CBC Auto Differential   4. Pure hypercholesterolemia  Lipid Panel w/ Reflex Direct LDL    Comprehensive Metabolic Panel    TSH with Reflex   5. Gastroesophageal reflux disease, unspecified whether esophagitis present     6. Non-occlusive coronary artery disease     7. Screening for prostate cancer  Psa screening   8.  Uncontrolled type 2 diabetes mellitus, without long-term current use of insulin (HCC)  Comprehensive Metabolic Panel    Hemoglobin A1C    Microalbumin / Creatinine Urine Ratio           Plan:      -  Chronic medical problems stable  -  Continue current medications  -  Follow up with specialists as scheduled

## 2021-02-25 RX ORDER — NYSTATIN 100000 U/G
CREAM TOPICAL
Qty: 60 G | Refills: 2 | Status: SHIPPED | OUTPATIENT
Start: 2021-02-25 | End: 2022-04-27

## 2021-02-25 NOTE — TELEPHONE ENCOUNTER
Requested Prescriptions     Pending Prescriptions Disp Refills    nystatin (MYCOSTATIN) 751039 UNIT/GM cream 60 g 2     Sig: Apply topically 2 times daily.

## 2021-03-15 RX ORDER — VALSARTAN AND HYDROCHLOROTHIAZIDE 320; 12.5 MG/1; MG/1
TABLET, FILM COATED ORAL
Qty: 30 TABLET | Refills: 10 | Status: SHIPPED | OUTPATIENT
Start: 2021-03-15 | End: 2022-02-10

## 2021-04-12 RX ORDER — SITAGLIPTIN 100 MG/1
TABLET, FILM COATED ORAL
Qty: 30 TABLET | Refills: 0 | Status: SHIPPED | OUTPATIENT
Start: 2021-04-12 | End: 2021-06-08

## 2021-04-23 DIAGNOSIS — E78.5 HYPERLIPIDEMIA, UNSPECIFIED HYPERLIPIDEMIA TYPE: ICD-10-CM

## 2021-04-23 LAB
ABSOLUTE BASO #: 0.1 X10E9/L (ref 0–0.2)
ABSOLUTE EOS #: 0.3 X10E9/L (ref 0–0.4)
ABSOLUTE LYMPH #: 2.5 X10E9/L (ref 1–3.5)
ABSOLUTE MONO #: 0.9 X10E9/L (ref 0–0.9)
ABSOLUTE NEUT #: 3.8 X10E9/L (ref 1.5–6.6)
ALBUMIN SERPL-MCNC: 4.3 G/DL (ref 3.2–5.3)
ALK PHOSPHATASE: 25 U/L (ref 39–130)
ALT SERPL-CCNC: 16 U/L (ref 0–40)
ANION GAP SERPL CALCULATED.3IONS-SCNC: 9 MMOL/L (ref 5–15)
AST SERPL-CCNC: 16 U/L (ref 0–41)
AVERAGE GLUCOSE: 180 MG/DL
BASOPHILS RELATIVE PERCENT: 0.7 %
BILIRUB SERPL-MCNC: 0.6 MG/DL (ref 0.3–1.2)
BUN BLDV-MCNC: 13 MG/DL (ref 5–27)
CALCIUM SERPL-MCNC: 9.1 MG/DL (ref 8.5–10.5)
CHLORIDE BLD-SCNC: 98 MMOL/L (ref 98–109)
CHOLESTEROL/HDL RATIO: 3.7 (ref 1–5)
CHOLESTEROL: 144 MG/DL (ref 150–200)
CO2: 33 MMOL/L (ref 22–32)
CREAT SERPL-MCNC: 0.85 MG/DL (ref 0.6–1.3)
CREATINE, URINE: 143.89 MG/DL
EGFR AFRICAN AMERICAN: >60 ML/MIN/1.73SQ.M
EGFR IF NONAFRICAN AMERICAN: >60 ML/MIN/1.73SQ.M
EOSINOPHILS RELATIVE PERCENT: 4.2 %
GLUCOSE: 179 MG/DL (ref 65–99)
HBA1C MFR BLD: 7.9 % (ref 4.4–6.4)
HCT VFR BLD CALC: 44.6 % (ref 39–49)
HDLC SERPL-MCNC: 39 MG/DL
HEMOGLOBIN: 15.1 G/DL (ref 13–17)
LDL CHOLESTEROL CALCULATED: 62 MG/DL
LDL/HDL RATIO: 1.6
LYMPHOCYTE %: 32.4 %
MCH RBC QN AUTO: 32.2 PG (ref 27–34)
MCHC RBC AUTO-ENTMCNC: 33.8 G/DL (ref 32–36)
MCV RBC AUTO: 96 FL (ref 80–100)
MICROALBUMIN/CREAT 24H UR: 22.2 MG/DL (ref 0–1.9)
MICROALBUMIN/CREAT UR-RTO: 154.3 MG/G CREAT (ref 0–30)
MONOCYTES # BLD: 12.2 %
NEUTROPHILS RELATIVE PERCENT: 50.5 %
PDW BLD-RTO: 13.2 % (ref 11.5–15)
PLATELETS: 222 X10E9/L (ref 150–450)
PMV BLD AUTO: 7.9 FL (ref 7–12)
POTASSIUM SERPL-SCNC: 4.6 MMOL/L (ref 3.5–5)
PSA, ULTRASENSITIVE: 0.25 NG/ML (ref 0–4)
RBC: 4.67 X10E12/L (ref 4.1–5.7)
SODIUM BLD-SCNC: 140 MMOL/L (ref 134–146)
TOTAL PROTEIN: 7.2 G/DL (ref 6–8)
TRIGL SERPL-MCNC: 215 MG/DL (ref 27–150)
TSH SERPL DL<=0.05 MIU/L-ACNC: 2.57 UIU/ML (ref 0.49–4.67)
VLDLC SERPL CALC-MCNC: 43 MG/DL (ref 0–30)
WBC: 7.6 X10E9/L (ref 4–11)

## 2021-04-26 RX ORDER — PRAVASTATIN SODIUM 80 MG/1
TABLET ORAL
Qty: 30 TABLET | Refills: 10 | Status: SHIPPED | OUTPATIENT
Start: 2021-04-26 | End: 2022-03-14

## 2021-04-27 ENCOUNTER — OFFICE VISIT (OUTPATIENT)
Dept: FAMILY MEDICINE CLINIC | Age: 61
End: 2021-04-27
Payer: MEDICARE

## 2021-04-27 VITALS
OXYGEN SATURATION: 94 % | SYSTOLIC BLOOD PRESSURE: 120 MMHG | DIASTOLIC BLOOD PRESSURE: 84 MMHG | HEART RATE: 72 BPM | RESPIRATION RATE: 20 BRPM | BODY MASS INDEX: 42.07 KG/M2 | WEIGHT: 315 LBS

## 2021-04-27 DIAGNOSIS — E78.00 PURE HYPERCHOLESTEROLEMIA: ICD-10-CM

## 2021-04-27 DIAGNOSIS — E11.42 DIABETIC POLYNEUROPATHY ASSOCIATED WITH TYPE 2 DIABETES MELLITUS (HCC): ICD-10-CM

## 2021-04-27 DIAGNOSIS — E66.01 MORBID OBESITY (HCC): ICD-10-CM

## 2021-04-27 DIAGNOSIS — E78.5 HYPERLIPIDEMIA, UNSPECIFIED HYPERLIPIDEMIA TYPE: ICD-10-CM

## 2021-04-27 DIAGNOSIS — K21.9 GASTROESOPHAGEAL REFLUX DISEASE, UNSPECIFIED WHETHER ESOPHAGITIS PRESENT: ICD-10-CM

## 2021-04-27 DIAGNOSIS — I10 ESSENTIAL HYPERTENSION: ICD-10-CM

## 2021-04-27 PROCEDURE — G8417 CALC BMI ABV UP PARAM F/U: HCPCS | Performed by: FAMILY MEDICINE

## 2021-04-27 PROCEDURE — G8427 DOCREV CUR MEDS BY ELIG CLIN: HCPCS | Performed by: FAMILY MEDICINE

## 2021-04-27 PROCEDURE — 3017F COLORECTAL CA SCREEN DOC REV: CPT | Performed by: FAMILY MEDICINE

## 2021-04-27 PROCEDURE — 1036F TOBACCO NON-USER: CPT | Performed by: FAMILY MEDICINE

## 2021-04-27 PROCEDURE — 2022F DILAT RTA XM EVC RTNOPTHY: CPT | Performed by: FAMILY MEDICINE

## 2021-04-27 PROCEDURE — 99214 OFFICE O/P EST MOD 30 MIN: CPT | Performed by: FAMILY MEDICINE

## 2021-04-27 PROCEDURE — 3051F HG A1C>EQUAL 7.0%<8.0%: CPT | Performed by: FAMILY MEDICINE

## 2021-04-27 ASSESSMENT — PATIENT HEALTH QUESTIONNAIRE - PHQ9
SUM OF ALL RESPONSES TO PHQ QUESTIONS 1-9: 0
2. FEELING DOWN, DEPRESSED OR HOPELESS: 0
1. LITTLE INTEREST OR PLEASURE IN DOING THINGS: 0
SUM OF ALL RESPONSES TO PHQ9 QUESTIONS 1 & 2: 0

## 2021-04-27 ASSESSMENT — ENCOUNTER SYMPTOMS
GASTROINTESTINAL NEGATIVE: 1
RESPIRATORY NEGATIVE: 1

## 2021-04-27 NOTE — PROGRESS NOTES
2021    Alonzo Gaston (:  1960) is a 59 y.o. male, here for a 6 month eval.  Chief Complaint   Patient presents with    6 Month Follow-Up    Diabetes     2    Hypertension    Discuss Labs    Other     declies diabetic foot exam today     6 month eval.  Doing well overall. Sugars jumped. Admittedly very inactive, drinking more pop. Lab Results   Component Value Date    LABA1C 7.9 (H) 2021    LABA1C 7.3 (H) 2019    LABA1C 8.2 (H) 2019     Lab Results   Component Value Date    LDLCALC 62 2021    CREATININE 0.85 2021     BP ok, weights up. BP Readings from Last 3 Encounters:   21 120/84   20 126/85   10/12/20 (!) 130/90     Wt Readings from Last 3 Encounters:   21 (!) 318 lb 14.4 oz (144.7 kg)   20 (!) 311 lb (141.1 kg)   10/12/20 (!) 310 lb (140.6 kg)     Tolerating meds, compliant. Patient Active Problem List   Diagnosis    HTN (hypertension)    Hyperlipidemia    GERD (gastroesophageal reflux disease)    ED (erectile dysfunction)    Morbid obesity ([address]    Non-occlusive coronary artery disease    Diabetes mellitus type II, controlled ([address]    Uncontrolled diabetes mellitus type 2 without complications    Controlled type 2 diabetes mellitus without complication, without long-term current use of insulin (HCC)    Diabetic neuropathy ([address]       Review of Systems   Constitutional: Negative. HENT: Negative. Respiratory: Negative. Cardiovascular: Negative. Gastrointestinal: Negative. Musculoskeletal: Negative. All other systems reviewed and are negative. Prior to Visit Medications    Medication Sig Taking?  Authorizing Provider   pravastatin (PRAVACHOL) 80 MG tablet TAKE 1 TABLET BY MOUTH ONCE DAILY Yes Denzel Garcia DO   JANUVIA 100 MG tablet TAKE 1 TABLET BY MOUTH EVERY MORNING BEFORE BREAKFAST Yes Denzel Garcia DO   valsartan-hydroCHLOROthiazide (DIOVAN-HCT) 320-12.5 MG per tablet TAKE 1 TABLET BY MOUTH ONCE DAILY Yes Carlos Brewster, DO   nystatin (MYCOSTATIN) 643326 UNIT/GM cream Apply topically 2 times daily. Yes Carlos Brewster, DO   albuterol sulfate HFA (VENTOLIN HFA) 108 (90 Base) MCG/ACT inhaler Inhale 2 puffs into the lungs every 4 hours as needed for Wheezing or Shortness of Breath Yes Jennifer Zacarias APRN - CNP   metoprolol tartrate (LOPRESSOR) 50 MG tablet TAKE 1 TABLET BY MOUTH TWICE DAILY Yes Carlos Brewster, DO   metFORMIN (GLUCOPHAGE) 1000 MG tablet TAKE ONE (1) TABLET BY MOUTH TWICE DAILY WITH FOOD Yes Carlos Brewster, DO   glipiZIDE (GLUCOTROL XL) 10 MG extended release tablet TAKE 1 TABLET BY MOUTH EVERY DAY Yes Carlos Brewster, DO   DULoxetine (CYMBALTA) 30 MG extended release capsule TAKE 1 CAPSULE BY MOUTH ONCE DAILY Yes Carlos Brewster, DO   amLODIPine (NORVASC) 10 MG tablet TAKE 1 TABLET BY MOUTH EVERY DAY Yes Carlos Brewster, DO   fluocinonide (LIDEX) 0.05 % cream Apply topically to legs 2 times daily prn. Yes Carlos Brewster, DO   empagliflozin (JARDIANCE) 10 MG tablet Take 1 tablet by mouth daily Yes Carlos Brewster DO   ibuprofen (ADVIL;MOTRIN) 600 MG tablet Take 1 tablet by mouth every 8 hours as needed for Pain Yes Carlos Brewster, DO   diclofenac sodium (VOLTAREN) 1 % GEL Apply 2 g topically 4 times daily Yes Carlos Brewster, DO   tiZANidine (ZANAFLEX) 4 MG tablet Take 1 tablet by mouth every 6 hours as needed (muscle spasm) Yes Isaías Knowles APRN - CNP   Insulin Pen Needle 31G X 5 MM MISC Use weekly with Trulicity Yes Carlos Brewster, DO   aspirin 81 MG EC tablet Take 1 tablet by mouth daily Yes Carlos Brewster DO   FISH OIL Take  by mouth daily. Yes Historical Provider, MD   Multiple Vitamins-Minerals (CENTRUM PO) Take 1 tablet by mouth daily.    Yes Historical Provider, MD        Allergies   Allergen Reactions    Latex Rash    Other Rash     Band-Aids         Past Medical History:   Diagnosis Date    Diabetes History   Problem Relation Age of Onset    Heart Disease Mother     Cancer Father        ADVANCE DIRECTIVE: N, <no information>    Vitals:    04/27/21 1317   BP: 120/84   Pulse: 72   Resp: 20   SpO2: 94%   Weight: (!) 318 lb 14.4 oz (144.7 kg)     Estimated body mass index is 42.07 kg/m² as calculated from the following:    Height as of 10/12/20: 6' 1\" (1.854 m). Weight as of this encounter: 318 lb 14.4 oz (144.7 kg). Physical Exam  Vitals signs and nursing note reviewed. Constitutional:       General: He is not in acute distress. Appearance: Normal appearance. He is well-developed. HENT:      Head: Normocephalic and atraumatic. Right Ear: Tympanic membrane normal.      Left Ear: Tympanic membrane normal.   Eyes:      Conjunctiva/sclera: Conjunctivae normal.   Neck:      Musculoskeletal: Neck supple. Cardiovascular:      Rate and Rhythm: Normal rate and regular rhythm. Heart sounds: Normal heart sounds. No murmur. Pulmonary:      Effort: Pulmonary effort is normal.      Breath sounds: Normal breath sounds. No wheezing, rhonchi or rales. Abdominal:      General: There is no distension. Skin:     General: Skin is warm and dry. Findings: No rash (on exposed surfaces). Neurological:      General: No focal deficit present. Mental Status: He is alert. Psychiatric:         Attention and Perception: Attention normal.         Mood and Affect: Mood normal.         Speech: Speech normal.         Behavior: Behavior normal. Behavior is cooperative. Thought Content: Thought content normal.         Judgment: Judgment normal.         No flowsheet data found.     Lab Results   Component Value Date    CHOL 144 04/23/2021    CHOL 160 06/21/2019    CHOL 159 02/15/2018    CHOL 158 06/25/2015    CHOL 162 12/20/2013    CHOL 155 06/06/2012    TRIG 215 04/23/2021    TRIG 308 06/21/2019    TRIG 281 02/15/2018    HDL 39 04/23/2021    HDL 36 06/21/2019    HDL 37 02/15/2018    HDL 35 06/06/2012    [address] 62 04/23/2021    LDLCALC 62 06/21/2019    LDLCALC 66 02/15/2018    GLUCOSE 179 04/23/2021    LABA1C 7.9 04/23/2021    LABA1C 7.3 12/23/2019    LABA1C 8.2 06/21/2019       The 10-year ASCVD risk score (Jory Greene et al., 2013) is: 19%    Values used to calculate the score:      Age: 59 years      Sex: Male      Is Non- : No      Diabetic: Yes      Tobacco smoker: No      Systolic Blood Pressure: 374 mmHg      Is BP treated: Yes      HDL Cholesterol: 39 mg/dL      Total Cholesterol: 144 mg/dL    Immunization History   Administered Date(s) Administered    DTaP 03/18/2015    Pneumococcal Polysaccharide ([id number]) 08/24/2017    Tdap (Boostrix, Adacel) 03/18/2015       Health Maintenance   Topic Date Due    COVID-19 Vaccine (1) Never done    Shingles Vaccine (1 of 2) Never done    Diabetic retinal exam  03/18/2016    Diabetic foot exam  12/24/2020    Flu vaccine (Season Ended) 09/01/2021    A1C test (Diabetic or Prediabetic)  04/23/2022    Diabetic microalbuminuria test  04/23/2022    Lipid screen  04/23/2022    Potassium monitoring  04/23/2022    Creatinine monitoring  04/23/2022    DTaP/Tdap/Td vaccine (2 - Td) 03/18/2025    Colon cancer screen colonoscopy  01/23/2030    Pneumococcal 0-64 years Vaccine  Completed    Hepatitis C screen  Completed    HIV screen  Completed    Hepatitis A vaccine  Aged Out    Hib vaccine  Aged Out    Meningococcal (ACWY) vaccine  Aged Out       ASSESSMENT/PLAN:  1. Uncontrolled type 2 diabetes mellitus, without long-term current use of insulin (HCC)  -     Hemoglobin A1C; Future  2. Essential hypertension  3. Pure hypercholesterolemia  4. Diabetic polyneuropathy associated with type 2 diabetes mellitus ([address]  5. Morbid obesity ([address]  6. Hyperlipidemia, unspecified hyperlipidemia type  7.  Gastroesophageal reflux disease, unspecified whether esophagitis present    -  Chronic medical problems stable  -  Labs reviewed, overall ok  -  Continue current medications, stressed dietary changes and wt loss  -  Follow up with specialists as scheduled  -  Check A1C again in 6 mos      Return in about 6 months (around 10/27/2021) for DM2. An electronic signature was used to authenticate this note.     --Thanh Sky, DO on 4/27/2021 at 1:50 PM

## 2021-05-24 ENCOUNTER — OFFICE VISIT (OUTPATIENT)
Dept: CARDIOLOGY CLINIC | Age: 61
End: 2021-05-24
Payer: MEDICARE

## 2021-05-24 VITALS
SYSTOLIC BLOOD PRESSURE: 138 MMHG | WEIGHT: 315 LBS | BODY MASS INDEX: 42.66 KG/M2 | HEIGHT: 72 IN | HEART RATE: 80 BPM | DIASTOLIC BLOOD PRESSURE: 76 MMHG

## 2021-05-24 DIAGNOSIS — E78.00 PURE HYPERCHOLESTEROLEMIA: ICD-10-CM

## 2021-05-24 DIAGNOSIS — R06.00 DYSPNEA, UNSPECIFIED TYPE: ICD-10-CM

## 2021-05-24 DIAGNOSIS — I25.10 NON-OCCLUSIVE CORONARY ARTERY DISEASE: ICD-10-CM

## 2021-05-24 DIAGNOSIS — E11.9 CONTROLLED TYPE 2 DIABETES MELLITUS WITHOUT COMPLICATION, WITHOUT LONG-TERM CURRENT USE OF INSULIN (HCC): ICD-10-CM

## 2021-05-24 DIAGNOSIS — I10 ESSENTIAL HYPERTENSION: Primary | ICD-10-CM

## 2021-05-24 PROCEDURE — 3017F COLORECTAL CA SCREEN DOC REV: CPT | Performed by: INTERNAL MEDICINE

## 2021-05-24 PROCEDURE — 99213 OFFICE O/P EST LOW 20 MIN: CPT | Performed by: INTERNAL MEDICINE

## 2021-05-24 PROCEDURE — 3051F HG A1C>EQUAL 7.0%<8.0%: CPT | Performed by: INTERNAL MEDICINE

## 2021-05-24 PROCEDURE — G8417 CALC BMI ABV UP PARAM F/U: HCPCS | Performed by: INTERNAL MEDICINE

## 2021-05-24 PROCEDURE — 1036F TOBACCO NON-USER: CPT | Performed by: INTERNAL MEDICINE

## 2021-05-24 PROCEDURE — 2022F DILAT RTA XM EVC RTNOPTHY: CPT | Performed by: INTERNAL MEDICINE

## 2021-05-24 PROCEDURE — G8427 DOCREV CUR MEDS BY ELIG CLIN: HCPCS | Performed by: INTERNAL MEDICINE

## 2021-05-24 NOTE — PROGRESS NOTES
capsule, TAKE 1 CAPSULE BY MOUTH ONCE DAILY, Disp: 30 capsule, Rfl: 10    amLODIPine (NORVASC) 10 MG tablet, TAKE 1 TABLET BY MOUTH EVERY DAY, Disp: 30 tablet, Rfl: 11    fluocinonide (LIDEX) 0.05 % cream, Apply topically to legs 2 times daily prn., Disp: 60 g, Rfl: 1    empagliflozin (JARDIANCE) 10 MG tablet, Take 1 tablet by mouth daily, Disp: 90 tablet, Rfl: 3    ibuprofen (ADVIL;MOTRIN) 600 MG tablet, Take 1 tablet by mouth every 8 hours as needed for Pain, Disp: 60 tablet, Rfl: 0    diclofenac sodium (VOLTAREN) 1 % GEL, Apply 2 g topically 4 times daily, Disp: 5 Tube, Rfl: 0    tiZANidine (ZANAFLEX) 4 MG tablet, Take 1 tablet by mouth every 6 hours as needed (muscle spasm), Disp: 20 tablet, Rfl: 0    Insulin Pen Needle 31G X 5 MM MISC, Use weekly with Trulicity, Disp: 612 each, Rfl: 0    aspirin 81 MG EC tablet, Take 1 tablet by mouth daily, Disp: 90 tablet, Rfl: 3    FISH OIL, Take  by mouth daily. , Disp: , Rfl:     Multiple Vitamins-Minerals (CENTRUM PO), Take 1 tablet by mouth daily. , Disp: , Rfl:     Past Medical History  Milka Danielle  has a past medical history of Diabetes mellitus ([address] ED (erectile dysfunction), Hyperlipidemia, Hypertension, Type 2 diabetes mellitus without complication ([address] and Type II or unspecified type diabetes mellitus without mention of complication, not stated as uncontrolled. Social History  Milka Danielle  reports that he quit smoking about 8 years ago. His smoking use included cigarettes. He has a 20.00 pack-year smoking history. He has never used smokeless tobacco. He reports current alcohol use of about 1.0 standard drinks of alcohol per week. He reports that he does not use drugs. Family History  Milka Danielle family history includes Cancer in his father; Heart Disease in his mother.     Past Surgical History   Past Surgical History:   Procedure Laterality Date    CARDIAC CATHETERIZATION      KNEE SURGERY Right 12/2017    VARICOSE VEIN SURGERY         Subjective: REVIEW OF SYSTEMS  Constitutional: denies sweats, chills and fever  HENT: denies  congestion, sinus pressure, sneezing and sore throat. Eyes: denies  pain, discharge, redness and itching. Respiratory: denies apnea, cough  Gastrointestinal: denies blood in stool, constipation, diarrhea   Endocrine: denies cold intolerance, heat intolerance, polydipsia. Genitourinary: denies dysuria, enuresis, flank pain and hematuria. Musculoskeletal: denies arthralgias, joint swelling and neck pain. Neurological: denies numbness and headaches. Psychiatric/Behavioral: denies agitation, confusion, decreased concentration and dysphoric mood    All others reviewed and are negative. Objective:     BP (!) 140/78   Pulse 80   Ht 6' (1.829 m)   Wt (!) 320 lb 6.4 oz (145.3 kg)   BMI 43.45 kg/m²     Wt Readings from Last 3 Encounters:   05/24/21 (!) 320 lb 6.4 oz (145.3 kg)   04/27/21 (!) 318 lb 14.4 oz (144.7 kg)   12/22/20 (!) 311 lb (141.1 kg)     BP Readings from Last 3 Encounters:   05/24/21 (!) 140/78   04/27/21 120/84   12/22/20 126/85       PHYSICAL EXAM  Constitutional: Oriented to person, place, and time. Appears well-developed and well-nourished. HENT:   Head: Normocephalic and atraumatic. Eyes: EOM are normal. Pupils are equal, round, and reactive to light. Neck: Normal range of motion. Neck supple. No JVD present. Cardiovascular: Normal rate , normal heart sounds and intact distal pulses. Pulmonary/Chest: Effort normal and breath sounds normal. No respiratory distress. No wheezes. No rales. Abdominal: Soft. Bowel sounds are normal. No distension. There is no tenderness. Musculoskeletal: Normal range of motion. No edema. Neurological: Alert and oriented to person, place, and time. No cranial nerve deficit. Coordination normal.   Skin: Skin is warm and dry. Psychiatric: Normal mood and affect.        No results found for: CKTOTAL, CKMB, CKMBINDEX    Lab Results   Component Value Date    WBC 7.6 04/23/2021    WBC 9.0 04/26/2019    RBC 4.67 04/23/2021    HGB 15.1 04/23/2021    HCT 44.6 04/23/2021    MCV 96 04/23/2021    MCH 32.2 04/23/2021    MCHC 33.8 04/23/2021    RDW 13.2 04/23/2021     04/23/2021     04/26/2019    MPV 7.9 04/23/2021       Lab Results   Component Value Date     04/23/2021    K 4.6 04/23/2021    CL 98 04/23/2021    CO2 33 04/23/2021    BUN 13 04/23/2021    LABALBU 4.3 04/23/2021    CREATININE 0.85 04/23/2021    CALCIUM 9.1 04/23/2021    LABGLOM 86 04/26/2019    GLUCOSE 179 04/23/2021       Lab Results   Component Value Date    ALKPHOS 25 04/23/2021    ALKPHOS 24 10/09/2018    ALT 16 04/23/2021    AST 16 04/23/2021    PROT 7.2 04/23/2021    BILITOT 0.6 04/23/2021    LABALBU 4.3 04/23/2021       No results found for: MG    Lab Results   Component Value Date    INR 0.78 (L) 10/23/2012         Lab Results   Component Value Date    LABA1C 7.9 04/23/2021       Lab Results   Component Value Date    TRIG 215 04/23/2021    HDL 39 04/23/2021    HDL 35 06/06/2012    LDLCALC 62 04/23/2021    LDLDIRECT 149 08/16/2017    LABVLDL 43 04/23/2021       Lab Results   Component Value Date    TSH 2.57 04/23/2021         Testing Reviewed:      I haveindividually reviewed the below cardiac tests    EKG:     ECHO: echo 9/21/17 ef at 45-50%    Results for orders placed during the hospital encounter of 09/21/17   ECHO Complete 2D W Doppler W Color    Narrative Transthoracic Echocardiography Report (TTE)     Demographics      Patient Name    Bossman Guzman  Gender               Male                   PJ      MR #            [id number]       Race                                                       Ethnicity      Account #       [de-identified]       Room Number      Accession       [id number]       Date of Study        09/21/2017   Number      Date of Birth   1960      Referring Physician  Jj Powell 57 year(s)      Sonographer          Tyson Sepulveda [facility]                                      Interpreting         Iram Haji MD                                   Physician     Procedure    Type of Study      TTE procedure:ECHOCARDIOGRAM COMPLETE 2D W DOPPLER W COLOR. Procedure Date  Date: 09/21/2017 Start: 07:42 AM    Study Location: Echo Lab  Technical Quality: Poor visualization due to body habitus. Indications:Abnormal ECG. Additional Medical History:Hypertension, hyperlipidemia, GERD, morbid  obesity, diabetic, remote smoker    Patient Status: Routine    Height: 72 inches Weight: 325.01 pounds BSA: 2.62 m^2 BMI: 44.08 kg/m^2    BP: 132/80 mmHg     Conclusions      Summary   Normal left ventricle size. Unable to determine wall motion abnormalities due to poor image quality. Ejection fraction was estimated at 45-50%. Doppler parameters were consistent with abnormal left ventricular   relaxation (grade 1 diastolic dysfunction). Technically difficult examination. Signature      ----------------------------------------------------------------   Electronically signed by Iram Haji MD (Interpreting   physician) on 09/22/2017 at 03:11 PM   ----------------------------------------------------------------      Findings      Mitral Valve   The mitral valve structure was normal with normal leaflet separation. DOPPLER: The transmitral velocity was within the normal range with no   evidence for mitral stenosis. There was trivial mitral regurgitation. Aortic Valve   The aortic valve leaflets were not well visualized. DOPPLER: Transaortic velocity was within the normal range with no evidence   of aortic stenosis. There was no evidence of aortic regurgitation. Tricuspid Valve   The tricuspid valve structure was normal with normal leaflet separation. DOPPLER: There was no evidence of tricuspid stenosis. There was trivial   tricuspid regurgitation.       Pulmonic Valve   The pulmonic valve was not well visualized . Left Atrium   Left atrial size was normal.      Left Ventricle   Normal left ventricle size. Unable to determine wall motion abnormalities due to poor image quality. Ejection fraction was estimated at 45-50%. Doppler parameters were consistent with abnormal left ventricular   relaxation (grade 1 diastolic dysfunction). Right Atrium   Right atrial size was normal.      Right Ventricle   The right ventricular size was normal.      Pericardial Effusion   Fat pad present. Aorta / Great Vessels   Aortic root dimension within normal limits.      M-Mode/2D Measurements & Calculations      LV Diastolic    LV Systolic Dimension: 4 cm AV Cusp Separation: 2.3 cmLA   Dimension: 5.3  LV Volume Diastolic: 699 ml Dimension: 3.9 cmAO Root   cm              LV Volume Systolic: 70 ml   Dimension: 3.7 cm   LV FS:24.5 %    LV EDV/LV EDV Index: 135   LV PW           ml/52 m^2LV ESV/LV ESV   Diastolic: 0.9  Index: 70 DU/12 m^2   cm              EF Calculated: 48.2 %       RV Diastolic Dimension: 2.8 cm   Septum   Diastolic: 1 cm                             LA/Aorta: 1.05     Doppler Measurements & Calculations      MV Peak E-Wave: 58.2 cm/s  AV Peak Velocity: 109 LVOT Peak Velocity: 93.8   MV Peak A-Wave: 76 cm/s    cm/s                  cm/s   MV E/A Ratio: 0.77         AV Peak Gradient:     LVOT Peak Gradient: 4   MV Peak Gradient: 1.35     4.75 mmHg             mmHg   mmHg                                                    TV Peak E-Wave: 44.4 cm/s   MV Deceleration Time: 229                        TV Peak A-Wave: 43.9 cm/s   msec                                                    TV Peak Gradient: 0.79                                                    mmHg   MV E' Septal Velocity:                           TR Velocity:202 cm/s   5.95 cm/s                  AV DVI (Vmax):0.86    TR Gradient:16.32 mmHg   MV A' Septal Velocity:                           PV Peak Velocity: 51.8   8.58 cm/s                                        cm/s   MV E' Lateral Velocity:                          PV Peak Gradient: 1.07   5.95 cm/s                                        mmHg   MV A' Lateral Velocity:   7.8 cm/s   E/E' septal: 9.78   E/E' lateral: 9.78     [email or web address] [id number]       STRESS:  9/2017 -    Lexiscan EKG stress test is not suggestive for ischemia. This Nuclear Medicine study was not conclusive due to significant artifact. CATH:  10/2012 -  Mr. Ben Triana underwent cardiac catheterization for an abnormal  stress test and several cardiovascular risk factors and he does not have obstructive coronary disease. Risk modification and medical management is recommended. Assessment/Plan       Diagnosis Orders   1. Essential hypertension     2. Pure hypercholesterolemia     3. Non-occlusive coronary artery disease     4. Controlled type 2 diabetes mellitus without complication, without long-term current use of insulin ([address]     5. Dyspnea, unspecified type       Shortness of breath  HTN  HLD  DM, A1c 7.9%  Triglycerides high  Microalbuminuria    Reviewed prior workup  Reviewed Echo  On Aspirin, statin, Diovan-HCT  Patient is scheduled for blood work  Continue amloidpine   Weight loss advised  The patient is asked to make an attempt to improve diet and exercise patterns to aid in medical management of this problem. Advised more plant based nutrition/meditarrean diet   Advised patient to call office or seek immediate medical attention if there is any new onset of  any chest pain, sob, palpitations, lightheadedness, dizziness, orthopnea, PND or pedal edema. All medication side effects were discussed in details. Thank youfor allowing me to participate in the care of this patient. Please do not hesitate to contact me for any further questions.      Return in about 9 months (around 2/24/2022) for Regular follow up, Review testing.        Electronically signed by Cristine Rain MD   5/24/2021 at 12:04 PM

## 2021-06-08 RX ORDER — SITAGLIPTIN 100 MG/1
TABLET, FILM COATED ORAL
Qty: 30 TABLET | Refills: 0 | Status: SHIPPED | OUTPATIENT
Start: 2021-06-08 | End: 2021-07-12

## 2021-08-24 ENCOUNTER — OFFICE VISIT (OUTPATIENT)
Dept: FAMILY MEDICINE CLINIC | Age: 61
End: 2021-08-24
Payer: MEDICARE

## 2021-08-24 VITALS
SYSTOLIC BLOOD PRESSURE: 142 MMHG | BODY MASS INDEX: 42.88 KG/M2 | RESPIRATION RATE: 20 BRPM | HEART RATE: 57 BPM | DIASTOLIC BLOOD PRESSURE: 84 MMHG | WEIGHT: 315 LBS | OXYGEN SATURATION: 97 %

## 2021-08-24 DIAGNOSIS — I25.10 NON-OCCLUSIVE CORONARY ARTERY DISEASE: ICD-10-CM

## 2021-08-24 DIAGNOSIS — R06.2 WHEEZING: ICD-10-CM

## 2021-08-24 DIAGNOSIS — L25.5 RHUS DERMATITIS: Primary | ICD-10-CM

## 2021-08-24 DIAGNOSIS — E66.01 MORBID OBESITY (HCC): ICD-10-CM

## 2021-08-24 DIAGNOSIS — R06.02 SOB (SHORTNESS OF BREATH): ICD-10-CM

## 2021-08-24 PROCEDURE — 3017F COLORECTAL CA SCREEN DOC REV: CPT | Performed by: FAMILY MEDICINE

## 2021-08-24 PROCEDURE — G8417 CALC BMI ABV UP PARAM F/U: HCPCS | Performed by: FAMILY MEDICINE

## 2021-08-24 PROCEDURE — G8427 DOCREV CUR MEDS BY ELIG CLIN: HCPCS | Performed by: FAMILY MEDICINE

## 2021-08-24 PROCEDURE — 99214 OFFICE O/P EST MOD 30 MIN: CPT | Performed by: FAMILY MEDICINE

## 2021-08-24 PROCEDURE — 1036F TOBACCO NON-USER: CPT | Performed by: FAMILY MEDICINE

## 2021-08-24 RX ORDER — METHYLPREDNISOLONE ACETATE 40 MG/ML
40 INJECTION, SUSPENSION INTRA-ARTICULAR; INTRALESIONAL; INTRAMUSCULAR; SOFT TISSUE ONCE
Status: COMPLETED | OUTPATIENT
Start: 2021-08-24 | End: 2021-08-24

## 2021-08-24 RX ORDER — METHYLPREDNISOLONE ACETATE 80 MG/ML
80 INJECTION, SUSPENSION INTRA-ARTICULAR; INTRALESIONAL; INTRAMUSCULAR; SOFT TISSUE ONCE
Status: COMPLETED | OUTPATIENT
Start: 2021-08-24 | End: 2021-08-24

## 2021-08-24 RX ORDER — ALBUTEROL SULFATE 90 UG/1
2 AEROSOL, METERED RESPIRATORY (INHALATION) EVERY 4 HOURS PRN
Qty: 1 INHALER | Refills: 5 | Status: SHIPPED | OUTPATIENT
Start: 2021-08-24

## 2021-08-24 RX ADMIN — METHYLPREDNISOLONE ACETATE 40 MG: 40 INJECTION, SUSPENSION INTRA-ARTICULAR; INTRALESIONAL; INTRAMUSCULAR; SOFT TISSUE at 11:53

## 2021-08-24 RX ADMIN — METHYLPREDNISOLONE ACETATE 80 MG: 80 INJECTION, SUSPENSION INTRA-ARTICULAR; INTRALESIONAL; INTRAMUSCULAR; SOFT TISSUE at 11:53

## 2021-08-24 SDOH — ECONOMIC STABILITY: FOOD INSECURITY: WITHIN THE PAST 12 MONTHS, YOU WORRIED THAT YOUR FOOD WOULD RUN OUT BEFORE YOU GOT MONEY TO BUY MORE.: NEVER TRUE

## 2021-08-24 SDOH — ECONOMIC STABILITY: FOOD INSECURITY: WITHIN THE PAST 12 MONTHS, THE FOOD YOU BOUGHT JUST DIDN'T LAST AND YOU DIDN'T HAVE MONEY TO GET MORE.: NEVER TRUE

## 2021-08-24 ASSESSMENT — SOCIAL DETERMINANTS OF HEALTH (SDOH): HOW HARD IS IT FOR YOU TO PAY FOR THE VERY BASICS LIKE FOOD, HOUSING, MEDICAL CARE, AND HEATING?: NOT HARD AT ALL

## 2021-08-24 ASSESSMENT — ENCOUNTER SYMPTOMS
GASTROINTESTINAL NEGATIVE: 1
SHORTNESS OF BREATH: 1
CHEST TIGHTNESS: 1
WHEEZING: 1
COUGH: 1

## 2021-08-24 NOTE — PROGRESS NOTES
Karla Caldwell (:  1960) is a 59 y.o. male,Established patient, here for evaluation of the following chief complaint(s):  Rash (to feet and back of legs- discuss treatment- patient has been using his albuterol inhaler 4-5 times per day ) and Shortness of Breath (with ambulation short distance-with wheezing- discuss treatment)        Subjective   SUBJECTIVE/OBJECTIVE:  HPI:    Chief Complaint   Patient presents with    Rash     to feet and back of legs- discuss treatment- patient has been using his albuterol inhaler 4-5 times per day     Shortness of Breath     with ambulation short distance-with wheezing- discuss treatment     Pt here for rash on to right foot and back of left leg. Noticed the rash about a week ago. Seeping at times. Very itchy. No OTC treatment. Pt also c/o feeling SOB over the last several months but seems to be worse over the last week. Increased LADD and wheezing. Needing inhaler more. Denies CP. Mentioned this to Cardio in May, did not feel it was cardiac in nature. BP mildly elevated. BP Readings from Last 3 Encounters:   21 (!) 142/84   21 138/76   21 120/84     Weight is actually down. Wt Readings from Last 3 Encounters:   21 (!) 316 lb 3.2 oz (143.4 kg)   21 (!) 320 lb 6.4 oz (145.3 kg)   21 (!) 318 lb 14.4 oz (144.7 kg)     Using albuterol prn, using more often recently.     Patient Active Problem List   Diagnosis    HTN (hypertension)    Hyperlipidemia    GERD (gastroesophageal reflux disease)    ED (erectile dysfunction)    Morbid obesity ([address]    Non-occlusive coronary artery disease    Diabetes mellitus type II, controlled ([address]    Uncontrolled diabetes mellitus type 2 without complications    Controlled type 2 diabetes mellitus without complication, without long-term current use of insulin (HCC)    Diabetic neuropathy ([address]     Past Surgical History:   Procedure Laterality Date    CARDIAC CATHETERIZATION      KNEE SURGERY Right 2017    VARICOSE VEIN SURGERY       Social History     Tobacco Use    Smoking status: Former Smoker     Packs/day: 2.00     Years: 10.00     Pack years: 20.00     Types: Cigarettes     Quit date: 2012     Years since quittin.9    Smokeless tobacco: Never Used   Vaping Use    Vaping Use: Never used   Substance Use Topics    Alcohol use: Yes     Alcohol/week: 1.0 standard drinks     Types: 1 Cans of beer per week     Comment: RARE    Drug use: No         Review of Systems   Constitutional: Negative. HENT: Negative. Respiratory: Positive for cough, chest tightness, shortness of breath and wheezing. Cardiovascular: Negative. Negative for chest pain and palpitations. Gastrointestinal: Negative. Musculoskeletal: Negative. Skin: Positive for rash. All other systems reviewed and are negative. Objective   Physical Exam  Vitals and nursing note reviewed. Constitutional:       General: He is not in acute distress. Appearance: Normal appearance. He is well-developed. HENT:      Head: Normocephalic and atraumatic. Right Ear: Tympanic membrane normal.      Left Ear: Tympanic membrane normal.   Eyes:      Conjunctiva/sclera: Conjunctivae normal.   Cardiovascular:      Rate and Rhythm: Normal rate and regular rhythm. Heart sounds: Normal heart sounds. No murmur heard. Pulmonary:      Effort: Pulmonary effort is normal. No accessory muscle usage or respiratory distress. Breath sounds: Wheezing present. No rhonchi or rales. Abdominal:      General: There is no distension. Musculoskeletal:      Cervical back: Neck supple. Skin:     General: Skin is warm and dry. Findings: Rash (rash as noted on diagram cw rhus derm) present. Neurological:      General: No focal deficit present. Mental Status: He is alert.    Psychiatric:         Attention and Perception: Attention normal.         Mood and Affect: Mood normal. Speech: Speech normal.         Behavior: Behavior normal. Behavior is cooperative. Thought Content: Thought content normal.         Judgment: Judgment normal.               ASSESSMENT/PLAN:  1. Rhus dermatitis  -     methylPREDNISolone acetate (DEPO-MEDROL) injection 80 mg; 80 mg, Intramuscular, ONCE, On Tue 8/24/21 at 1215, For 1 dose  -     methylPREDNISolone acetate (DEPO-MEDROL) injection 40 mg; 40 mg, Intramuscular, ONCE, On Tue 8/24/21 at 1215, For 1 dose  -     fluocinonide (LIDEX) 0.05 % cream; Apply topically 2 times daily. , Disp-60 g, R-0, Normal  2. SOB (shortness of breath)  3. Wheezing  4. Non-occlusive coronary artery disease  5. Morbid obesity ([address]    -  Orders above  -  Continue albuterol prn    Return if symptoms worsen or fail to improve. If no relief in SOB with the steroids, plan CXR and PFT. An electronic signature was used to authenticate this note.     --Gregory Sandhu, DO

## 2021-08-24 NOTE — PROGRESS NOTES
Per orders of Dr. Abhay Reid, injection of Depo-Medrol 2 ML given IM  In the left dorsogluteal by Ivon Elliott CMA ([facility]). Patient instructed to report any adverse reaction to me immediately. Patient tolerated injection well. Administrations This Visit     methylPREDNISolone acetate (DEPO-MEDROL) injection 40 mg     Admin Date  08/24/2021  11:53 Action  Given Dose  40 mg Route  Intramuscular Site  Dorsogluteal Left Administered By  Ivon Elliott CMA ([address] [place] [address])    Ordering Provider: Lolita Zarate DO    NDC: 9634-5059-73    Lot#: [id number]    : EDITD. Patient Supplied?: No          methylPREDNISolone acetate (DEPO-MEDROL) injection 80 mg     Admin Date  08/24/2021  11:53 Action  Given Dose  80 mg Route  Intramuscular Site  Dorsogluteal Left Administered By  Ivon Elliott CMA ([address] [place] [address])    Ordering Provider: Lolita Zarate DO    NDC: 0853-0644-94    Lot#: [id number]    : EDITD.     Patient Supplied?: No

## 2021-10-15 DIAGNOSIS — I10 ESSENTIAL HYPERTENSION: ICD-10-CM

## 2021-10-15 DIAGNOSIS — E11.42 DIABETIC POLYNEUROPATHY ASSOCIATED WITH TYPE 2 DIABETES MELLITUS (HCC): ICD-10-CM

## 2021-10-15 RX ORDER — AMLODIPINE BESYLATE 10 MG/1
TABLET ORAL
Qty: 30 TABLET | Refills: 11 | Status: SHIPPED | OUTPATIENT
Start: 2021-10-15 | End: 2021-10-18

## 2021-10-15 RX ORDER — DULOXETIN HYDROCHLORIDE 30 MG/1
CAPSULE, DELAYED RELEASE ORAL
Qty: 30 CAPSULE | Refills: 10 | Status: SHIPPED | OUTPATIENT
Start: 2021-10-15 | End: 2021-10-18

## 2021-10-15 RX ORDER — GLIPIZIDE 10 MG/1
TABLET, FILM COATED, EXTENDED RELEASE ORAL
Qty: 30 TABLET | Refills: 10 | Status: SHIPPED | OUTPATIENT
Start: 2021-10-15 | End: 2021-10-18

## 2021-10-15 RX ORDER — METOPROLOL TARTRATE 50 MG/1
TABLET, FILM COATED ORAL
Qty: 60 TABLET | Refills: 10 | Status: SHIPPED | OUTPATIENT
Start: 2021-10-15 | End: 2021-10-18

## 2021-10-18 DIAGNOSIS — E11.42 DIABETIC POLYNEUROPATHY ASSOCIATED WITH TYPE 2 DIABETES MELLITUS (HCC): ICD-10-CM

## 2021-10-18 DIAGNOSIS — I10 ESSENTIAL HYPERTENSION: ICD-10-CM

## 2021-10-18 RX ORDER — METOPROLOL TARTRATE 50 MG/1
TABLET, FILM COATED ORAL
Qty: 60 TABLET | Refills: 10 | Status: SHIPPED | OUTPATIENT
Start: 2021-10-18 | End: 2022-10-17

## 2021-10-18 RX ORDER — GLIPIZIDE 10 MG/1
TABLET, FILM COATED, EXTENDED RELEASE ORAL
Qty: 30 TABLET | Refills: 10 | Status: SHIPPED | OUTPATIENT
Start: 2021-10-18 | End: 2022-08-23

## 2021-10-18 RX ORDER — AMLODIPINE BESYLATE 10 MG/1
TABLET ORAL
Qty: 30 TABLET | Refills: 10 | Status: SHIPPED | OUTPATIENT
Start: 2021-10-18 | End: 2022-09-23

## 2021-10-18 RX ORDER — DULOXETIN HYDROCHLORIDE 30 MG/1
CAPSULE, DELAYED RELEASE ORAL
Qty: 30 CAPSULE | Refills: 10 | Status: SHIPPED | OUTPATIENT
Start: 2021-10-18 | End: 2022-09-16

## 2021-10-21 ENCOUNTER — VIRTUAL VISIT (OUTPATIENT)
Dept: FAMILY MEDICINE CLINIC | Age: 61
End: 2021-10-21
Payer: MEDICARE

## 2021-10-21 DIAGNOSIS — M25.562 ACUTE PAIN OF LEFT KNEE: Primary | ICD-10-CM

## 2021-10-21 DIAGNOSIS — M25.572 ACUTE LEFT ANKLE PAIN: ICD-10-CM

## 2021-10-21 PROCEDURE — 3017F COLORECTAL CA SCREEN DOC REV: CPT | Performed by: FAMILY MEDICINE

## 2021-10-21 PROCEDURE — G8428 CUR MEDS NOT DOCUMENT: HCPCS | Performed by: FAMILY MEDICINE

## 2021-10-21 PROCEDURE — 99213 OFFICE O/P EST LOW 20 MIN: CPT | Performed by: FAMILY MEDICINE

## 2021-10-21 RX ORDER — TRAMADOL HYDROCHLORIDE 50 MG/1
50 TABLET ORAL EVERY 8 HOURS PRN
Qty: 15 TABLET | Refills: 0 | Status: SHIPPED | OUTPATIENT
Start: 2021-10-21 | End: 2021-10-26

## 2021-10-21 RX ORDER — METHYLPREDNISOLONE 4 MG/1
TABLET ORAL
Qty: 1 KIT | Refills: 0 | Status: SHIPPED | OUTPATIENT
Start: 2021-10-21 | End: 2021-10-27

## 2021-10-21 ASSESSMENT — ENCOUNTER SYMPTOMS
RESPIRATORY NEGATIVE: 1
GASTROINTESTINAL NEGATIVE: 1

## 2021-10-21 NOTE — PROGRESS NOTES
Thomas Ortiz (:  1960) is a 59 y.o. male,Established patient, here for evaluation of the following chief complaint(s): Knee Pain and Ankle Pain              SUBJECTIVE/OBJECTIVE:  HPI:    Chief Complaint   Patient presents with    Knee Pain    Ankle Pain     Pt presents today with c/o left knee and left ankle pain/swelling for 1 week. NKI. Warm to touch per pt but no redness. Both joints swollen. Hard to bear weight. Denies calf pain, swelling. Patient Active Problem List   Diagnosis    HTN (hypertension)    Hyperlipidemia    GERD (gastroesophageal reflux disease)    ED (erectile dysfunction)    Morbid [facility] ([facility]    Non-occlusive coronary artery disease    Diabetes mellitus type II, controlled ([facility]    Uncontrolled diabetes mellitus type 2 without complications    Controlled type 2 diabetes mellitus without complication, without long-term current use of insulin (HCC)    Diabetic neuropathy ([facility]     Past Surgical History:   Procedure Laterality Date    CARDIAC CATHETERIZATION      KNEE SURGERY Right 2017    VARICOSE VEIN SURGERY       Social History     Tobacco Use    Smoking status: Former Smoker     Packs/day: 2.00     Years: 10.00     Pack years: 20.00     Types: Cigarettes     Quit date: 2012     Years since quittin.0    Smokeless tobacco: Never Used   Vaping Use    Vaping Use: Never used   Substance Use Topics    Alcohol use: Yes     Alcohol/week: 1.0 standard drinks     Types: 1 Cans of beer per week     Comment: RARE    Drug use: No     Review of Systems   Constitutional: Negative. HENT: Negative. Respiratory: Negative. Cardiovascular: Negative. Gastrointestinal: Negative. Musculoskeletal: Positive for arthralgias (left knee and ankle) and joint swelling. All other systems reviewed and are negative. No flowsheet data found. Physical Exam  Constitutional:       General: He is not in acute distress.      Appearance: Normal appearance. He is well-developed. He is not ill-appearing. HENT:      Head: Normocephalic and atraumatic. Right Ear: External ear normal.      Left Ear: External ear normal.   Eyes:      Conjunctiva/sclera: Conjunctivae normal.   Pulmonary:      Effort: Pulmonary effort is normal. No respiratory distress. Skin:     Findings: No rash (on exposed surfaces). Neurological:      Mental Status: He is alert and oriented to person, place, and time. Psychiatric:         Mood and Affect: Mood normal.         Behavior: Behavior normal.         Thought Content: Thought content normal.         Judgment: Judgment normal.     ASSESSMENT/PLAN:  1. Acute pain of left knee  -     methylPREDNISolone (MEDROL, JANET,) 4 MG tablet; As directed, Disp-1 kit, R-0Normal  2. Acute left ankle pain  -     traMADol (ULTRAM) 50 MG tablet; Take 1 tablet by mouth every 8 hours as needed for Pain for up to 5 days. , Disp-15 tablet, [id number]    -  Uncertain etiology without an exam  -  Low suspicion for DVT  -  Question pseudogout vs OA vs internal derangement vs other  -  Orders above    Return if symptoms worsen or fail to improve. Gabriela Espinosa, was evaluated through a synchronous (real-time) audio-video encounter. The patient (or guardian if applicable) is aware that this is a billable service. Verbal consent to proceed has been obtained within the past 12 months. The visit was conducted pursuant to the emergency declaration under the [address] [place] [address] authority and the [employer] and [employer] Act. Patient identification was verified, and a caregiver was present when appropriate. The patient was located in a state where the provider was credentialed to provide care. An electronic signature was used to authenticate this note.     --Don Mcnally, DO

## 2021-10-27 ENCOUNTER — OFFICE VISIT (OUTPATIENT)
Dept: FAMILY MEDICINE CLINIC | Age: 61
End: 2021-10-27
Payer: MEDICARE

## 2021-10-27 VITALS
SYSTOLIC BLOOD PRESSURE: 128 MMHG | BODY MASS INDEX: 41.81 KG/M2 | HEART RATE: 80 BPM | WEIGHT: 308.3 LBS | DIASTOLIC BLOOD PRESSURE: 72 MMHG | RESPIRATION RATE: 20 BRPM

## 2021-10-27 DIAGNOSIS — I10 ESSENTIAL HYPERTENSION: ICD-10-CM

## 2021-10-27 DIAGNOSIS — I25.10 NON-OCCLUSIVE CORONARY ARTERY DISEASE: ICD-10-CM

## 2021-10-27 DIAGNOSIS — E66.01 MORBID OBESITY (HCC): ICD-10-CM

## 2021-10-27 DIAGNOSIS — E78.00 PURE HYPERCHOLESTEROLEMIA: ICD-10-CM

## 2021-10-27 DIAGNOSIS — Z12.5 SCREENING FOR PROSTATE CANCER: ICD-10-CM

## 2021-10-27 DIAGNOSIS — E11.42 DIABETIC POLYNEUROPATHY ASSOCIATED WITH TYPE 2 DIABETES MELLITUS (HCC): ICD-10-CM

## 2021-10-27 DIAGNOSIS — M25.572 ACUTE LEFT ANKLE PAIN: Primary | ICD-10-CM

## 2021-10-27 PROCEDURE — G8417 CALC BMI ABV UP PARAM F/U: HCPCS | Performed by: FAMILY MEDICINE

## 2021-10-27 PROCEDURE — G8484 FLU IMMUNIZE NO ADMIN: HCPCS | Performed by: FAMILY MEDICINE

## 2021-10-27 PROCEDURE — 3051F HG A1C>EQUAL 7.0%<8.0%: CPT | Performed by: FAMILY MEDICINE

## 2021-10-27 PROCEDURE — 3017F COLORECTAL CA SCREEN DOC REV: CPT | Performed by: FAMILY MEDICINE

## 2021-10-27 PROCEDURE — G8427 DOCREV CUR MEDS BY ELIG CLIN: HCPCS | Performed by: FAMILY MEDICINE

## 2021-10-27 PROCEDURE — 2022F DILAT RTA XM EVC RTNOPTHY: CPT | Performed by: FAMILY MEDICINE

## 2021-10-27 PROCEDURE — 1036F TOBACCO NON-USER: CPT | Performed by: FAMILY MEDICINE

## 2021-10-27 PROCEDURE — 99214 OFFICE O/P EST MOD 30 MIN: CPT | Performed by: FAMILY MEDICINE

## 2021-10-27 ASSESSMENT — ENCOUNTER SYMPTOMS
GASTROINTESTINAL NEGATIVE: 1
RESPIRATORY NEGATIVE: 1

## 2021-10-27 NOTE — PROGRESS NOTES
Jose Cummings (:  1960) is a 59 y.o. male,Established patient, here for evaluation of the following chief complaint(s):  6 Month Follow-Up        Subjective   SUBJECTIVE/OBJECTIVE:  HPI:    Chief Complaint   Patient presents with    6 Month Follow-Up     6 month eval.      Still with pain and swelling to the left ankle. S/P MDP and tramadol which seemed to help. Pain located on the inside of the foot. NKI. Left knee doing much better, just the ankle today. Due for A1C, has an order. Lab Results   Component Value Date    LABA1C 7.9 (H) 2021    LABA1C 7.3 (H) 2019    LABA1C 8.2 (H) 2019     Lab Results   Component Value Date    LDLCALC 62 2021    CREATININE 0.85 2021     BP looks fine. BP Readings from Last 3 Encounters:   10/27/21 128/72   21 (!) 142/84   21 138/76     Weight is down 12 lbs since May.   Wt Readings from Last 3 Encounters:   10/27/21 (!) 308 lb 4.8 oz (139.8 kg)   21 (!) 316 lb 3.2 oz (143.4 kg)   21 (!) 320 lb 6.4 oz (145.3 kg)       Patient Active Problem List   Diagnosis    HTN (hypertension)    Hyperlipidemia    GERD (gastroesophageal reflux disease)    ED (erectile dysfunction)    Morbid obesity ([address]    Non-occlusive coronary artery disease    Diabetes mellitus type II, controlled ([address]    Uncontrolled diabetes mellitus type 2 without complications    Controlled type 2 diabetes mellitus without complication, without long-term current use of insulin (HCC)    Diabetic neuropathy ([address]     Past Surgical History:   Procedure Laterality Date    CARDIAC CATHETERIZATION      KNEE SURGERY Right 2017    VARICOSE VEIN SURGERY       Social History     Tobacco Use    Smoking status: Former Smoker     Packs/day: 2.00     Years: 10.00     Pack years: 20.00     Types: Cigarettes     Quit date: 2012     Years since quittin.1    Smokeless tobacco: Never Used   Vaping Use    Vaping Use: Never used   Substance with type 2 diabetes mellitus ([facility]  4. Uncontrolled type 2 diabetes mellitus, without long-term current use of insulin (HCC)  -     Comprehensive Metabolic Panel; Future  -     Hemoglobin A1C; Future  -     Microalbumin / Creatinine Urine Ratio; Future  5. Morbid obesity ([facility]  6. Non-occlusive coronary artery disease  7. Pure hypercholesterolemia  -     Lipid Panel w/ Reflex Direct LDL; Future  -     Comprehensive Metabolic Panel; Future  -     TSH with Reflex; Future  8. Screening for prostate cancer  -     PSA screening; Future    -  Chronic medical problems stable  -  Continue current medications  -  Follow up with specialists as scheduled  -  Due for A1C, has order at home  -  Check XR left ankle, will call with results and recommendations  -  Recheck maintenance labs 6 mos    Return in about 6 months (around 4/27/2022) for DM2. An electronic signature was used to authenticate this note.     --Denver Merino DO

## 2021-12-07 LAB
AVERAGE GLUCOSE: 214 MG/DL
HBA1C MFR BLD: 9.1 % (ref 4.4–6.4)

## 2022-02-10 RX ORDER — VALSARTAN AND HYDROCHLOROTHIAZIDE 320; 12.5 MG/1; MG/1
TABLET, FILM COATED ORAL
Qty: 30 TABLET | Refills: 10 | Status: SHIPPED | OUTPATIENT
Start: 2022-02-10

## 2022-03-11 DIAGNOSIS — E78.5 HYPERLIPIDEMIA, UNSPECIFIED HYPERLIPIDEMIA TYPE: ICD-10-CM

## 2022-03-14 RX ORDER — PRAVASTATIN SODIUM 80 MG/1
TABLET ORAL
Qty: 30 TABLET | Refills: 10 | Status: SHIPPED | OUTPATIENT
Start: 2022-03-14

## 2022-04-22 LAB
ABSOLUTE BASO #: 0 X10E9/L (ref 0–0.2)
ABSOLUTE EOS #: 0.4 X10E9/L (ref 0–0.4)
ABSOLUTE LYMPH #: 2.5 X10E9/L (ref 1–3.5)
ABSOLUTE MONO #: 1 X10E9/L (ref 0–0.9)
ABSOLUTE NEUT #: 4.2 X10E9/L (ref 1.5–6.6)
ALBUMIN SERPL-MCNC: 4 G/DL (ref 3.2–5.3)
ALK PHOSPHATASE: 28 U/L (ref 39–130)
ALT SERPL-CCNC: 20 U/L (ref 0–40)
ANION GAP SERPL CALCULATED.3IONS-SCNC: 10 MMOL/L (ref 5–15)
AST SERPL-CCNC: 19 U/L (ref 0–41)
AVERAGE GLUCOSE: 220 MG/DL
BASOPHILS RELATIVE PERCENT: 0.5 %
BILIRUB SERPL-MCNC: 0.7 MG/DL (ref 0.3–1.2)
BUN BLDV-MCNC: 14 MG/DL (ref 5–27)
CALCIUM SERPL-MCNC: 9.2 MG/DL (ref 8.5–10.5)
CHLORIDE BLD-SCNC: 97 MMOL/L (ref 98–109)
CHOLESTEROL/HDL RATIO: 4.7 (ref 1–5)
CHOLESTEROL: 156 MG/DL (ref 150–200)
CO2: 32 MMOL/L (ref 22–32)
CREAT SERPL-MCNC: 0.84 MG/DL (ref 0.6–1.3)
EGFR AFRICAN AMERICAN: >60 ML/MIN/1.73SQ.M
EGFR IF NONAFRICAN AMERICAN: >60 ML/MIN/1.73SQ.M
EOSINOPHILS RELATIVE PERCENT: 5.4 %
GLUCOSE: 228 MG/DL (ref 65–99)
HBA1C MFR BLD: 9.3 % (ref 4.4–6.4)
HCT VFR BLD CALC: 44.8 % (ref 39–49)
HDLC SERPL-MCNC: 33 MG/DL
HEMOGLOBIN: 15.4 G/DL (ref 13–17)
LDL CHOLESTEROL CALCULATED: 80 MG/DL
LDL/HDL RATIO: 2.4
LYMPHOCYTE %: 30.2 %
MCH RBC QN AUTO: 32 PG (ref 27–34)
MCHC RBC AUTO-ENTMCNC: 34.4 G/DL (ref 32–36)
MCV RBC AUTO: 93 FL (ref 80–100)
MONOCYTES # BLD: 12 %
NEUTROPHILS RELATIVE PERCENT: 51.9 %
PDW BLD-RTO: 13.2 % (ref 11.5–15)
PLATELETS: 259 X10E9/L (ref 150–450)
PMV BLD AUTO: 8 FL (ref 7–12)
POTASSIUM SERPL-SCNC: 4.3 MMOL/L (ref 3.5–5)
PSA, ULTRASENSITIVE: 0.28 NG/ML (ref 0–4)
RBC: 4.82 X10E12/L (ref 4.1–5.7)
SODIUM BLD-SCNC: 139 MMOL/L (ref 134–146)
TOTAL PROTEIN: 7.3 G/DL (ref 6–8)
TRIGL SERPL-MCNC: 217 MG/DL (ref 27–150)
TSH SERPL DL<=0.05 MIU/L-ACNC: 3.54 UIU/ML (ref 0.49–4.67)
VLDLC SERPL CALC-MCNC: 43 MG/DL (ref 0–30)
WBC: 8.2 X10E9/L (ref 4–11)

## 2022-04-23 LAB
CREATINE, URINE: 47.76 MG/DL
MICROALBUMIN/CREAT 24H UR: 3.8 MG/DL (ref 0–1.9)
MICROALBUMIN/CREAT UR-RTO: 79.6 MG/G CREAT (ref 0–30)

## 2022-04-27 ENCOUNTER — OFFICE VISIT (OUTPATIENT)
Dept: FAMILY MEDICINE CLINIC | Age: 62
End: 2022-04-27
Payer: MEDICARE

## 2022-04-27 VITALS
SYSTOLIC BLOOD PRESSURE: 124 MMHG | BODY MASS INDEX: 42.25 KG/M2 | WEIGHT: 311.5 LBS | DIASTOLIC BLOOD PRESSURE: 78 MMHG | HEART RATE: 76 BPM | RESPIRATION RATE: 16 BRPM

## 2022-04-27 DIAGNOSIS — E78.00 PURE HYPERCHOLESTEROLEMIA: ICD-10-CM

## 2022-04-27 DIAGNOSIS — E66.01 MORBID OBESITY (HCC): ICD-10-CM

## 2022-04-27 DIAGNOSIS — E11.42 DIABETIC POLYNEUROPATHY ASSOCIATED WITH TYPE 2 DIABETES MELLITUS (HCC): ICD-10-CM

## 2022-04-27 DIAGNOSIS — E78.5 HYPERLIPIDEMIA, UNSPECIFIED HYPERLIPIDEMIA TYPE: ICD-10-CM

## 2022-04-27 DIAGNOSIS — I10 ESSENTIAL HYPERTENSION: ICD-10-CM

## 2022-04-27 PROCEDURE — 99214 OFFICE O/P EST MOD 30 MIN: CPT | Performed by: FAMILY MEDICINE

## 2022-04-27 PROCEDURE — 3017F COLORECTAL CA SCREEN DOC REV: CPT | Performed by: FAMILY MEDICINE

## 2022-04-27 PROCEDURE — 1036F TOBACCO NON-USER: CPT | Performed by: FAMILY MEDICINE

## 2022-04-27 PROCEDURE — 2022F DILAT RTA XM EVC RTNOPTHY: CPT | Performed by: FAMILY MEDICINE

## 2022-04-27 PROCEDURE — G8417 CALC BMI ABV UP PARAM F/U: HCPCS | Performed by: FAMILY MEDICINE

## 2022-04-27 PROCEDURE — 3046F HEMOGLOBIN A1C LEVEL >9.0%: CPT | Performed by: FAMILY MEDICINE

## 2022-04-27 PROCEDURE — G8427 DOCREV CUR MEDS BY ELIG CLIN: HCPCS | Performed by: FAMILY MEDICINE

## 2022-04-27 RX ORDER — EMPAGLIFLOZIN 25 MG/1
1 TABLET, FILM COATED ORAL DAILY
Qty: 90 TABLET | Refills: 3 | Status: SHIPPED | OUTPATIENT
Start: 2022-04-27

## 2022-04-27 ASSESSMENT — ENCOUNTER SYMPTOMS
GASTROINTESTINAL NEGATIVE: 1
RESPIRATORY NEGATIVE: 1

## 2022-04-27 NOTE — PROGRESS NOTES
Fany Castellanos (:  1960) is a 57 y.o. male,Established patient, here for evaluation of the following chief complaint(s):  6 Month Follow-Up        Subjective   SUBJECTIVE/OBJECTIVE:  HPI:    Chief Complaint   Patient presents with    6 Month Follow-Up     Sugars continue to rise. Diet is terrible. Compliant with meds. Lab Results   Component Value Date    LABA1C 9.3 (H) 2022    LABA1C 9.1 (H) 2021    LABA1C 7.9 (H) 2021     Lab Results   Component Value Date    LDLCALC 80 2022    CREATININE 0.84 2022     BPs and weight stable. BP Readings from Last 3 Encounters:   22 124/78   10/27/21 128/72   21 (!) 142/84     Wt Readings from Last 3 Encounters:   22 (!) 311 lb 8 oz (141.3 kg)   10/27/21 (!) 308 lb 4.8 oz (139.8 kg)   21 (!) 316 lb 3.2 oz (143.4 kg)     No acute concerns. Patient Active Problem List   Diagnosis    HTN (hypertension)    Hyperlipidemia    GERD (gastroesophageal reflux disease)    ED (erectile dysfunction)    Morbid obesity ([address]    Non-occlusive coronary artery disease    Diabetes mellitus type II, controlled ([address]    Uncontrolled diabetes mellitus type 2 without complications    Controlled type 2 diabetes mellitus without complication, without long-term current use of insulin (HCC)    Diabetic neuropathy ([address]     Past Surgical History:   Procedure Laterality Date    CARDIAC CATHETERIZATION      KNEE SURGERY Right 2017    VARICOSE VEIN SURGERY       Social History     Tobacco Use    Smoking status: Former Smoker     Packs/day: 2.00     Years: 10.00     Pack years: 20.00     Types: Cigarettes     Quit date: 2012     Years since quittin.6    Smokeless tobacco: Never Used   Vaping Use    Vaping Use: Never used   Substance Use Topics    Alcohol use:  Yes     Alcohol/week: 1.0 standard drink     Types: 1 Cans of beer per week     Comment: RARE    Drug use: No     Prior to Admission medications Medication Sig Start Date End Date Taking? Authorizing Provider   Omega-3 Fatty Acids (FISH OIL PO) Take by mouth   Yes Historical Provider, MD   empagliflozin (JARDIANCE) 25 MG tablet Take 1 tablet by mouth daily 4/27/22  Yes Yvrose Ventura DO   pravastatin (PRAVACHOL) 80 MG tablet TAKE 1 TABLET BY MOUTH ONCE DAILY 3/14/22  Yes Yvrose Ventura DO   valsartan-hydroCHLOROthiazide (DIOVAN-HCT) 320-12.5 MG per tablet TAKE 1 TABLET BY MOUTH ONCE DAILY 2/10/22  Yes Yvrose Ventura DO   metoprolol tartrate (LOPRESSOR) 50 MG tablet TAKE ONE (1) TABLET BY MOUTH TWICE DAILY 10/18/21  Yes Yvrose Ventura DO   DULoxetine (CYMBALTA) 30 MG extended release capsule TAKE ONE (1) CAPSULE BY MOUTH DAILY 10/18/21  Yes Yvrose Ventura DO   amLODIPine (NORVASC) 10 MG tablet TAKE 1 TABLET BY MOUTH EVERY DAY 10/18/21  Yes Yvrose Ventura DO   glipiZIDE (GLUCOTROL XL) 10 MG extended release tablet TAKE 1 TABLET BY MOUTH DAILY 10/18/21  Yes Yvrose Ventura DO   metFORMIN (GLUCOPHAGE) 1000 MG tablet TAKE ONE (1) TABLET BY MOUTH TWICE DAILY WITH FOOD 10/15/21  Yes Yvrose Ventura DO   albuterol sulfate HFA (VENTOLIN HFA) 108 (90 Base) MCG/ACT inhaler Inhale 2 puffs into the lungs every 4 hours as needed for Wheezing or Shortness of Breath 8/24/21  Yes Yvrose Ventura DO   JANUVIA 100 MG tablet TAKE 1 TABLET BY MOUTH EVERY MORNING BEFORE BREAKFAST 7/12/21  Yes Yvrose Ventura DO   ibuprofen (ADVIL;MOTRIN) 600 MG tablet Take 1 tablet by mouth every 8 hours as needed for Pain 6/24/20  Yes Yvrose Ventura DO   diclofenac sodium (VOLTAREN) 1 % GEL Apply 2 g topically 4 times daily 5/29/20  Yes Yvrose Ventura DO   Insulin Pen Needle 31G X 5 MM MISC Use weekly with Trulicity 6/19/47  Yes Yvrose Ventura DO   aspirin 81 MG EC tablet Take 1 tablet by mouth daily 2/22/17  Yes Yvrose Ventura, DO   FISH OIL Take  by mouth daily.    Yes Historical Provider, MD   Multiple Vitamins-Minerals (CENTRUM PO) Take 1 tablet by mouth daily. Yes Historical Provider, MD         Review of Systems   Constitutional: Negative. HENT: Negative. Respiratory: Negative. Cardiovascular: Negative. Gastrointestinal: Negative. Musculoskeletal: Negative. All other systems reviewed and are negative. Objective   Physical Exam  Vitals and nursing note reviewed. Constitutional:       General: He is not in acute distress. Appearance: Normal appearance. He is well-developed. HENT:      Head: Normocephalic and atraumatic. Right Ear: Tympanic membrane normal.      Left Ear: Tympanic membrane normal.   Eyes:      Conjunctiva/sclera: Conjunctivae normal.   Cardiovascular:      Rate and Rhythm: Normal rate and regular rhythm. Heart sounds: Normal heart sounds. No murmur heard. Pulmonary:      Effort: Pulmonary effort is normal.      Breath sounds: Normal breath sounds. No wheezing, rhonchi or rales. Abdominal:      General: There is no distension. Musculoskeletal:      Cervical back: Neck supple. Skin:     General: Skin is warm and dry. Findings: No rash (on exposed surfaces). Neurological:      General: No focal deficit present. Mental Status: He is alert. Psychiatric:         Attention and Perception: Attention normal.         Mood and Affect: Mood normal.         Speech: Speech normal.         Behavior: Behavior normal. Behavior is cooperative. Thought Content: Thought content normal.         Judgment: Judgment normal.               ASSESSMENT/PLAN:  1. Uncontrolled type 2 diabetes mellitus, without long-term current use of insulin ([facility])  -     empagliflozin (JARDIANCE) 25 MG tablet; Take 1 tablet by mouth daily, Disp-90 tablet, R-3Normal  -     HM DIABETES FOOT EXAM  -     Hemoglobin A1C; Future  2. Hyperlipidemia, unspecified hyperlipidemia type  3. Essential hypertension  4.  Diabetic polyneuropathy associated with type 2 diabetes mellitus ([facility]  5. Morbid obesity ([facility]  6. Pure hypercholesterolemia    -  Chronic medical problems stable  -  Labs reviewed, ok other than A1C  -  Continue current medications, will increase Jardiance  -  Dietary changes discussed  -  Recheck A1C 6 mos      Return in about 6 months (around 10/27/2022) for DM2. Plan to switch Januvia to weekly GLP-1 at that time if no improvement. An electronic signature was used to authenticate this note.     --Yousif Graham, DO

## 2022-06-09 ENCOUNTER — OFFICE VISIT (OUTPATIENT)
Dept: CARDIOLOGY CLINIC | Age: 62
End: 2022-06-09
Payer: MEDICARE

## 2022-06-09 VITALS
BODY MASS INDEX: 42.66 KG/M2 | DIASTOLIC BLOOD PRESSURE: 82 MMHG | WEIGHT: 315 LBS | HEIGHT: 72 IN | SYSTOLIC BLOOD PRESSURE: 130 MMHG | HEART RATE: 73 BPM

## 2022-06-09 DIAGNOSIS — I10 PRIMARY HYPERTENSION: ICD-10-CM

## 2022-06-09 DIAGNOSIS — R06.02 SOB (SHORTNESS OF BREATH): Primary | ICD-10-CM

## 2022-06-09 PROCEDURE — 99213 OFFICE O/P EST LOW 20 MIN: CPT | Performed by: INTERNAL MEDICINE

## 2022-06-09 PROCEDURE — 93000 ELECTROCARDIOGRAM COMPLETE: CPT | Performed by: INTERNAL MEDICINE

## 2022-06-09 PROCEDURE — 1036F TOBACCO NON-USER: CPT | Performed by: INTERNAL MEDICINE

## 2022-06-09 PROCEDURE — G8417 CALC BMI ABV UP PARAM F/U: HCPCS | Performed by: INTERNAL MEDICINE

## 2022-06-09 PROCEDURE — G8427 DOCREV CUR MEDS BY ELIG CLIN: HCPCS | Performed by: INTERNAL MEDICINE

## 2022-06-09 PROCEDURE — 3017F COLORECTAL CA SCREEN DOC REV: CPT | Performed by: INTERNAL MEDICINE

## 2022-06-09 NOTE — PROGRESS NOTES
[address] [place] [address],[place] [address] [address] [address]  Dept: [phone]  Dept Fax: [phone]  Loc: [phone]    Visit Date: 6/9/2022    Mr. Von Haddad is a 57 y.o. male  who presented for:  Chief Complaint   Patient presents with    1 Year Follow Up    Shortness of Breath       HPI:   58 yo F here for follow-up appointment. Patient followed with Dr. Alexa Greer in the past. PMHx significant for HTN, HLD, DM. Comes for a follow up. States he has Asthma and uses inhalers. BP well controlled.            Current Outpatient Medications:     Omega-3 Fatty Acids (FISH OIL PO), Take by mouth, Disp: , Rfl:     empagliflozin (JARDIANCE) 25 MG tablet, Take 1 tablet by mouth daily, Disp: 90 tablet, Rfl: 3    pravastatin (PRAVACHOL) 80 MG tablet, TAKE 1 TABLET BY MOUTH ONCE DAILY, Disp: 30 tablet, Rfl: 10    valsartan-hydroCHLOROthiazide (DIOVAN-HCT) 320-12.5 MG per tablet, TAKE 1 TABLET BY MOUTH ONCE DAILY, Disp: 30 tablet, Rfl: 10    metoprolol tartrate (LOPRESSOR) 50 MG tablet, TAKE ONE (1) TABLET BY MOUTH TWICE DAILY, Disp: 60 tablet, Rfl: 10    DULoxetine (CYMBALTA) 30 MG extended release capsule, TAKE ONE (1) CAPSULE BY MOUTH DAILY, Disp: 30 capsule, Rfl: 10    amLODIPine (NORVASC) 10 MG tablet, TAKE 1 TABLET BY MOUTH EVERY DAY, Disp: 30 tablet, Rfl: 10    glipiZIDE (GLUCOTROL XL) 10 MG extended release tablet, TAKE 1 TABLET BY MOUTH DAILY, Disp: 30 tablet, Rfl: 10    metFORMIN (GLUCOPHAGE) 1000 MG tablet, TAKE ONE (1) TABLET BY MOUTH TWICE DAILY WITH FOOD, Disp: 60 tablet, Rfl: 10    albuterol sulfate HFA (VENTOLIN HFA) 108 (90 Base) MCG/ACT inhaler, Inhale 2 puffs into the lungs every 4 hours as needed for Wheezing or Shortness of Breath, Disp: 1 Inhaler, Rfl: 5    JANUVIA 100 MG tablet, TAKE 1 TABLET BY MOUTH EVERY MORNING BEFORE BREAKFAST, Disp: 30 tablet, Rfl: 11    ibuprofen (ADVIL;MOTRIN) 600 MG tablet, Take 1 tablet by mouth every 8 hours as needed for Pain, Disp: 60 tablet, Rfl: 0    diclofenac sodium (VOLTAREN) 1 % GEL, Apply 2 g topically 4 times daily, Disp: 5 Tube, Rfl: 0    Insulin Pen Needle 31G X 5 MM MISC, Use weekly with Trulicity, Disp: 774 each, Rfl: 0    aspirin 81 MG EC tablet, Take 1 tablet by mouth daily, Disp: 90 tablet, Rfl: 3    Multiple Vitamins-Minerals (CENTRUM PO), Take 1 tablet by mouth daily. , Disp: , Rfl:     Past Medical History  Jennifer Reina  has a past medical history of Diabetes mellitus ([facility] ED (erectile dysfunction), Hyperlipidemia, Hypertension, Type 2 diabetes mellitus without complication ([facility] and Type II or unspecified type diabetes mellitus without mention of complication, not stated as uncontrolled. Social History  Jennifer Reina  reports that he quit smoking about 9 years ago. His smoking use included cigarettes. He has a 20.00 pack-year smoking history. He has never used smokeless tobacco. He reports current alcohol use of about 1.0 standard drink of alcohol per week. He reports that he does not use drugs. Family History  Jennifer Reina family history includes Cancer in his father; Heart Disease in his mother. Past Surgical History   Past Surgical History:   Procedure Laterality Date    CARDIAC CATHETERIZATION      KNEE SURGERY Right 12/2017    VARICOSE VEIN SURGERY         Subjective:     REVIEW OF SYSTEMS  Constitutional: denies sweats, chills and fever  HENT: denies  congestion, sinus pressure, sneezing and sore throat. Eyes: denies  pain, discharge, redness and itching. Respiratory: denies apnea, cough  Gastrointestinal: denies blood in stool, constipation, diarrhea   Endocrine: denies cold intolerance, heat intolerance, polydipsia. Genitourinary: denies dysuria, enuresis, flank pain and hematuria. Musculoskeletal: denies arthralgias, joint swelling and neck pain. Neurological: denies numbness and headaches.    Psychiatric/Behavioral: denies agitation, confusion, decreased concentration and dysphoric mood    All others reviewed and are negative. Objective:     /82   Pulse 73   Ht 6' (1.829 m)   Wt (!) 315 lb (142.9 kg)   BMI 42.72 kg/m²     Wt Readings from Last 3 Encounters:   06/09/22 (!) 315 lb (142.9 kg)   04/27/22 (!) 311 lb 8 oz (141.3 kg)   10/27/21 (!) 308 lb 4.8 oz (139.8 kg)     BP Readings from Last 3 Encounters:   06/09/22 130/82   04/27/22 124/78   10/27/21 128/72       PHYSICAL EXAM  Constitutional: Oriented to person, place, and time. Appears well-developed and well-nourished. HENT:   Head: Normocephalic and atraumatic. Eyes: EOM are normal. Pupils are equal, round, and reactive to light. Neck: Normal range of motion. Neck supple. No JVD present. Cardiovascular: Normal rate , normal heart sounds and intact distal pulses. Pulmonary/Chest: Effort normal and breath sounds normal. No respiratory distress. No wheezes. No rales. Abdominal: Soft. Bowel sounds are normal. No distension. There is no tenderness. Musculoskeletal: Normal range of motion. No edema. Neurological: Alert and oriented to person, place, and time. No cranial nerve deficit. Coordination normal.   Skin: Skin is warm and dry. Psychiatric: Normal mood and affect.        No results found for: CKTOTAL, CKMB, CKMBINDEX    Lab Results   Component Value Date    WBC 8.2 04/21/2022    WBC 9.0 04/26/2019    RBC 4.82 04/21/2022    HGB 15.4 04/21/2022    HCT 44.8 04/21/2022    MCV 93 04/21/2022    MCH 32.0 04/21/2022    MCHC 34.4 04/21/2022    RDW 13.2 04/21/2022     04/21/2022     04/26/2019    MPV 8.0 04/21/2022       Lab Results   Component Value Date     04/21/2022    K 4.3 04/21/2022    CL 97 04/21/2022    CO2 32 04/21/2022    BUN 14 04/21/2022    LABALBU 4.0 04/21/2022    CREATININE 0.84 04/21/2022    CALCIUM 9.2 04/21/2022    LABGLOM 86 04/26/2019    GLUCOSE 228 04/21/2022       Lab Results   Component Value Date    ALKPHOS 28 04/21/2022    ALKPHOS 24 10/09/2018    ALT 20 04/21/2022    AST 19 04/21/2022    PROT 7.3 04/21/2022    BILITOT 0.7 04/21/2022    LABALBU 4.0 04/21/2022       No results found for: MG    Lab Results   Component Value Date    INR 0.78 (L) 10/23/2012         Lab Results   Component Value Date    LABA1C 9.3 04/21/2022       Lab Results   Component Value Date    TRIG 217 04/21/2022    HDL 33 04/21/2022    HDL 35 06/06/2012    LDLCALC 80 04/21/2022    LDLDIRECT 149 08/16/2017    LABVLDL 43 04/21/2022       Lab Results   Component Value Date    TSH 3.54 04/21/2022         Testing Reviewed:      I haveindividually reviewed the below cardiac tests    EKG:     ECHO: echo 9/21/17 ef at 45-50%    Results for orders placed during the hospital encounter of 09/21/17   ECHO Complete 2D W Doppler W Color    Narrative Transthoracic Echocardiography Report (TTE)     Demographics      Patient Name    Dhara Santos  Gender               Male                   L      MR #            [id number]       Race                                                       Ethnicity      Account #       [de-identified]       Room Number      Accession       [id number]       Date of Study        09/21/2017   Number      Date of Birth   1960      Referring Physician  Elmer VALENTIN DO      Age             59 year(s)      Sonographer          Cortez Jameson MD                                   Physician     Procedure    Type of Study      TTE procedure:ECHOCARDIOGRAM COMPLETE 2D W DOPPLER W COLOR. Procedure Date  Date: 09/21/2017 Start: 07:42 AM    Study Location: Echo Lab  Technical Quality: Poor visualization due to body habitus. Indications:Abnormal ECG.     Additional Medical History:Hypertension, hyperlipidemia, GERD, morbid  obesity, diabetic, remote smoker    Patient Status: Routine    Height: 72 inches Weight: 325.01 pounds BSA: 2.62 m^2 BMI: 44.08 kg/m^2    BP: 132/80 mmHg     Conclusions      Summary   Normal left ventricle size. Unable to determine wall motion abnormalities due to poor image quality. Ejection fraction was estimated at 45-50%. Doppler parameters were consistent with abnormal left ventricular   relaxation (grade 1 diastolic dysfunction). Technically difficult examination. Signature      ----------------------------------------------------------------   Electronically signed by Jack Carroll MD (Interpreting   physician) on 09/22/2017 at 03:11 PM   ----------------------------------------------------------------      Findings      Mitral Valve   The mitral valve structure was normal with normal leaflet separation. DOPPLER: The transmitral velocity was within the normal range with no   evidence for mitral stenosis. There was trivial mitral regurgitation. Aortic Valve   The aortic valve leaflets were not well visualized. DOPPLER: Transaortic velocity was within the normal range with no evidence   of aortic stenosis. There was no evidence of aortic regurgitation. Tricuspid Valve   The tricuspid valve structure was normal with normal leaflet separation. DOPPLER: There was no evidence of tricuspid stenosis. There was trivial   tricuspid regurgitation. Pulmonic Valve   The pulmonic valve was not well visualized . Left Atrium   Left atrial size was normal.      Left Ventricle   Normal left ventricle size. Unable to determine wall motion abnormalities due to poor image quality. Ejection fraction was estimated at 45-50%. Doppler parameters were consistent with abnormal left ventricular   relaxation (grade 1 diastolic dysfunction). Right Atrium   Right atrial size was normal.      Right Ventricle   The right ventricular size was normal.      Pericardial Effusion   Fat pad present. Aorta / Great Vessels   Aortic root dimension within normal limits.      M-Mode/2D Measurements & Calculations      LV Diastolic    LV Systolic Dimension: 4 cm AV Cusp Separation: 2.3 cmLA   Dimension: 5.3  LV Volume Diastolic: 504 ml Dimension: 3.9 cmAO Root   cm              LV Volume Systolic: 70 ml   Dimension: 3.7 cm   LV FS:24.5 %    LV EDV/LV EDV Index: 135   LV PW           ml/52 m^2LV ESV/LV ESV   Diastolic: 0.9  Index: 70 SQ/14 m^2   cm              EF Calculated: 48.2 %       RV Diastolic Dimension: 2.8 cm   Septum   Diastolic: 1 cm                             LA/Aorta: 1.05     Doppler Measurements & Calculations      MV Peak E-Wave: 58.2 cm/s  AV Peak Velocity: 109 LVOT Peak Velocity: 93.8   MV Peak A-Wave: 76 cm/s    cm/s                  cm/s   MV E/A Ratio: 0.77         AV Peak Gradient:     LVOT Peak Gradient: 4   MV Peak Gradient: 1.35     4.75 mmHg             mmHg   mmHg                                                    TV Peak E-Wave: 44.4 cm/s   MV Deceleration Time: 229                        TV Peak A-Wave: 43.9 cm/s   msec                                                    TV Peak Gradient: 0.79                                                    mmHg   MV E' Septal Velocity:                           TR Velocity:202 cm/s   5.95 cm/s                  AV DVI (Vmax):0.86    TR Gradient:16.32 mmHg   MV A' Septal Velocity:                           PV Peak Velocity: 51.8   8.58 cm/s                                        cm/s   MV E' Lateral Velocity:                          PV Peak Gradient: 1.07   5.95 cm/s                                        mmHg   MV A' Lateral Velocity:   7.8 cm/s   E/E' septal: 9.78   E/E' lateral: 9.78     [email or web address]\[facility] [place] [email or web address] [id number]       STRESS:  9/2017 -    Lexiscan EKG stress test is not suggestive for ischemia. This Nuclear Medicine study was not conclusive due to significant artifact. CATH:  10/2012 -  Mr. Hardyesperanza Wayne underwent cardiac catheterization for an abnormal  stress test and several cardiovascular risk factors and he does not have obstructive coronary disease. Risk modification and medical management is recommended. Assessment/Plan       Diagnosis Orders   1. SOB (shortness of breath)  EKG 12 Lead   2. Primary hypertension  EKG 12 Lead     Shortness of breath, improving  Obesity  Mild LV dysfunction 45-50%  HTN  HLD  DM, A1c 7.9%  Triglycerides high  Microalbuminuria    Reviewed prior workup  No cardiac symptoms  Reviewed Echo  On Aspirin, statin, Diovan-HCT  Continue amloidpine   Weight loss advised  The patient is asked to make an attempt to improve diet and exercise patterns to aid in medical management of this problem. Advised more plant based nutrition/meditarrean diet   Advised patient to call office or seek immediate medical attention if there is any new onset of  any chest pain, sob, palpitations, lightheadedness, dizziness, orthopnea, PND or pedal edema. All medication side effects were discussed in details. Thank youfor allowing me to participate in the care of this patient. Please do not hesitate to contact me for any further questions. Return if symptoms worsen or fail to improve, for Review testing, Regular follow up.        Electronically signed by Michelle Mcdaniel MD   6/9/2022 at 12:04 PM

## 2022-07-06 RX ORDER — SITAGLIPTIN 100 MG/1
TABLET, FILM COATED ORAL
Qty: 30 TABLET | Refills: 11 | Status: SHIPPED | OUTPATIENT
Start: 2022-07-06

## 2022-08-23 RX ORDER — GLIPIZIDE 10 MG/1
TABLET, FILM COATED, EXTENDED RELEASE ORAL
Qty: 30 TABLET | Refills: 10 | Status: SHIPPED | OUTPATIENT
Start: 2022-08-23

## 2022-09-15 DIAGNOSIS — E11.42 DIABETIC POLYNEUROPATHY ASSOCIATED WITH TYPE 2 DIABETES MELLITUS (HCC): ICD-10-CM

## 2022-09-16 RX ORDER — DULOXETIN HYDROCHLORIDE 30 MG/1
CAPSULE, DELAYED RELEASE ORAL
Qty: 30 CAPSULE | Refills: 10 | Status: SHIPPED | OUTPATIENT
Start: 2022-09-16

## 2022-09-22 DIAGNOSIS — I10 ESSENTIAL HYPERTENSION: ICD-10-CM

## 2022-09-23 RX ORDER — AMLODIPINE BESYLATE 10 MG/1
TABLET ORAL
Qty: 30 TABLET | Refills: 10 | Status: SHIPPED | OUTPATIENT
Start: 2022-09-23

## 2022-10-17 DIAGNOSIS — I10 ESSENTIAL HYPERTENSION: ICD-10-CM

## 2022-10-17 RX ORDER — METOPROLOL TARTRATE 50 MG/1
TABLET, FILM COATED ORAL
Qty: 60 TABLET | Refills: 10 | Status: SHIPPED | OUTPATIENT
Start: 2022-10-17

## 2022-10-26 LAB
AVERAGE GLUCOSE: 192 MG/DL
HBA1C MFR BLD: 8.3 % (ref 4.2–5.6)

## 2022-10-27 ENCOUNTER — OFFICE VISIT (OUTPATIENT)
Dept: FAMILY MEDICINE CLINIC | Age: 62
End: 2022-10-27
Payer: MEDICARE

## 2022-10-27 VITALS
DIASTOLIC BLOOD PRESSURE: 74 MMHG | BODY MASS INDEX: 41.99 KG/M2 | RESPIRATION RATE: 16 BRPM | WEIGHT: 309.6 LBS | SYSTOLIC BLOOD PRESSURE: 130 MMHG | HEART RATE: 76 BPM

## 2022-10-27 DIAGNOSIS — E66.01 MORBID OBESITY (HCC): ICD-10-CM

## 2022-10-27 DIAGNOSIS — Z12.5 SCREENING FOR PROSTATE CANCER: ICD-10-CM

## 2022-10-27 DIAGNOSIS — E78.5 HYPERLIPIDEMIA, UNSPECIFIED HYPERLIPIDEMIA TYPE: ICD-10-CM

## 2022-10-27 DIAGNOSIS — E11.42 DIABETIC POLYNEUROPATHY ASSOCIATED WITH TYPE 2 DIABETES MELLITUS (HCC): ICD-10-CM

## 2022-10-27 DIAGNOSIS — E11.65 UNCONTROLLED TYPE 2 DIABETES MELLITUS WITH HYPERGLYCEMIA (HCC): Primary | ICD-10-CM

## 2022-10-27 DIAGNOSIS — I10 ESSENTIAL HYPERTENSION: ICD-10-CM

## 2022-10-27 PROCEDURE — G8417 CALC BMI ABV UP PARAM F/U: HCPCS | Performed by: FAMILY MEDICINE

## 2022-10-27 PROCEDURE — 3078F DIAST BP <80 MM HG: CPT | Performed by: FAMILY MEDICINE

## 2022-10-27 PROCEDURE — G8427 DOCREV CUR MEDS BY ELIG CLIN: HCPCS | Performed by: FAMILY MEDICINE

## 2022-10-27 PROCEDURE — 3052F HG A1C>EQUAL 8.0%<EQUAL 9.0%: CPT | Performed by: FAMILY MEDICINE

## 2022-10-27 PROCEDURE — 99214 OFFICE O/P EST MOD 30 MIN: CPT | Performed by: FAMILY MEDICINE

## 2022-10-27 PROCEDURE — 3017F COLORECTAL CA SCREEN DOC REV: CPT | Performed by: FAMILY MEDICINE

## 2022-10-27 PROCEDURE — 1036F TOBACCO NON-USER: CPT | Performed by: FAMILY MEDICINE

## 2022-10-27 PROCEDURE — 2022F DILAT RTA XM EVC RTNOPTHY: CPT | Performed by: FAMILY MEDICINE

## 2022-10-27 PROCEDURE — 3074F SYST BP LT 130 MM HG: CPT | Performed by: FAMILY MEDICINE

## 2022-10-27 PROCEDURE — G8484 FLU IMMUNIZE NO ADMIN: HCPCS | Performed by: FAMILY MEDICINE

## 2022-10-27 SDOH — ECONOMIC STABILITY: FOOD INSECURITY: WITHIN THE PAST 12 MONTHS, YOU WORRIED THAT YOUR FOOD WOULD RUN OUT BEFORE YOU GOT MONEY TO BUY MORE.: NEVER TRUE

## 2022-10-27 SDOH — ECONOMIC STABILITY: FOOD INSECURITY: WITHIN THE PAST 12 MONTHS, THE FOOD YOU BOUGHT JUST DIDN'T LAST AND YOU DIDN'T HAVE MONEY TO GET MORE.: NEVER TRUE

## 2022-10-27 ASSESSMENT — ENCOUNTER SYMPTOMS
GASTROINTESTINAL NEGATIVE: 1
RESPIRATORY NEGATIVE: 1

## 2022-10-27 ASSESSMENT — PATIENT HEALTH QUESTIONNAIRE - PHQ9
SUM OF ALL RESPONSES TO PHQ QUESTIONS 1-9: 0
2. FEELING DOWN, DEPRESSED OR HOPELESS: 0
SUM OF ALL RESPONSES TO PHQ9 QUESTIONS 1 & 2: 0
1. LITTLE INTEREST OR PLEASURE IN DOING THINGS: 0
SUM OF ALL RESPONSES TO PHQ QUESTIONS 1-9: 0

## 2022-10-27 ASSESSMENT — SOCIAL DETERMINANTS OF HEALTH (SDOH): HOW HARD IS IT FOR YOU TO PAY FOR THE VERY BASICS LIKE FOOD, HOUSING, MEDICAL CARE, AND HEATING?: NOT HARD AT ALL

## 2022-10-27 NOTE — PROGRESS NOTES
Spoke with Francesco Espinoza at [address] [place] [address]# [phone]  regarding Metoprolol 50mg 1 pill BID missing from pill packets for the past month. She informed me this has been taken care of and new pill packets will ship to patient tomorrow or Monday. Patient and wife Claude Aguirre notified at appt.

## 2022-10-27 NOTE — PROGRESS NOTES
Argelia Dia (:  1960) is a 57 y.o. male,Established patient, here for evaluation of the following chief complaint(s):  6 Month Follow-Up, Diabetes, Results, and Medication Problem (Off of Metoprolol x 1 month due to pharmacy)        Subjective   SUBJECTIVE/OBJECTIVE:  HPI:    Chief Complaint   Patient presents with    6 Month Follow-Up    Diabetes    Results    Medication Problem     Off of Metoprolol x 1 month due to pharmacy     Sugars improving. Lab Results   Component Value Date    LABA1C 8.3 (H) 10/26/2022    LABA1C 9.3 (H) 2022    LABA1C 9.1 (H) 2021     Lab Results   Component Value Date    LDLCALC 80 2022    CREATININE 0.84 2022     BP Readings from Last 3 Encounters:   10/27/22 130/74   22 130/82   22 124/78     Wt Readings from Last 3 Encounters:   10/27/22 (!) 309 lb 9.6 oz (140.4 kg)   22 (!) 315 lb (142.9 kg)   22 (!) 311 lb 8 oz (141.3 kg)     Patient Active Problem List   Diagnosis    HTN (hypertension)    Hyperlipidemia    GERD (gastroesophageal reflux disease)    ED (erectile dysfunction)    Morbid obesity ([address]    Non-occlusive coronary artery disease    Diabetes mellitus type II, controlled ([address]    Uncontrolled diabetes mellitus type 2 without complications    Controlled type 2 diabetes mellitus without complication, without long-term current use of insulin ([address]    Diabetic neuropathy ([address]     Past Surgical History:   Procedure Laterality Date    CARDIAC CATHETERIZATION      KNEE SURGERY Right 2017    VARICOSE VEIN SURGERY       Social History     Tobacco Use    Smoking status: Former     Packs/day: 2.00     Years: 10.00     Pack years: 20.00     Types: Cigarettes     Quit date: 2012     Years since quitting: 10.1    Smokeless tobacco: Never   Vaping Use    Vaping Use: Never used   Substance Use Topics    Alcohol use:  Yes     Alcohol/week: 1.0 standard drink     Types: 1 Cans of beer per week     Comment: RARE    Drug use: No         Review of Systems   Constitutional: Negative. HENT: Negative. Respiratory: Negative. Cardiovascular: Negative. Gastrointestinal: Negative. Musculoskeletal: Negative. All other systems reviewed and are negative. Objective   Physical Exam  Vitals and nursing note reviewed. Constitutional:       General: He is not in acute distress. Appearance: Normal appearance. He is well-developed. HENT:      Head: Normocephalic and atraumatic. Right Ear: Tympanic membrane normal.      Left Ear: Tympanic membrane normal.   Eyes:      Conjunctiva/sclera: Conjunctivae normal.   Cardiovascular:      Rate and Rhythm: Normal rate and regular rhythm. Heart sounds: Normal heart sounds. No murmur heard. Pulmonary:      Effort: Pulmonary effort is normal.      Breath sounds: Normal breath sounds. No wheezing, rhonchi or rales. Abdominal:      General: There is no distension. Musculoskeletal:      Cervical back: Neck supple. Skin:     General: Skin is warm and dry. Findings: No rash (on exposed surfaces). Neurological:      General: No focal deficit present. Mental Status: He is alert. Psychiatric:         Attention and Perception: Attention normal.         Mood and Affect: Mood normal.         Speech: Speech normal.         Behavior: Behavior normal. Behavior is cooperative. Thought Content: Thought content normal.         Judgment: Judgment normal.             ASSESSMENT/PLAN:  1. Uncontrolled type 2 diabetes mellitus with hyperglycemia (HCC)  -     Comprehensive Metabolic Panel; Future  -     Hemoglobin A1C; Future  -     Microalbumin / Creatinine Urine Ratio; Future  2. Diabetic polyneuropathy associated with type 2 diabetes mellitus ([facility]  3. Hyperlipidemia, unspecified hyperlipidemia type  -     Lipid Panel w/ Reflex Direct LDL; Future  -     Comprehensive Metabolic Panel; Future  -     TSH with Reflex; Future  4.  Essential hypertension  -     CBC with Auto Differential; Future  5. Morbid obesity ([facility]  6. Screening for prostate cancer  -     PSA Screening; Future    -  Chronic medical problems stable  -  Sugars improving, still not to goal  -  Continue to work hard on diet  -  Continue current medications  -  Follow up with specialists as scheduled  -  Recheck labs 6 mos    Return in about 6 months (around 4/27/2023) for DM2. An electronic signature was used to authenticate this note.     --Natalia Flores, DO

## 2022-12-21 RX ORDER — VALSARTAN AND HYDROCHLOROTHIAZIDE 320; 12.5 MG/1; MG/1
TABLET, FILM COATED ORAL
Qty: 30 TABLET | Refills: 10 | Status: SHIPPED | OUTPATIENT
Start: 2022-12-21

## 2023-01-01 ENCOUNTER — HOSPITAL ENCOUNTER (EMERGENCY)
Age: 63
End: 2023-03-17
Attending: STUDENT IN AN ORGANIZED HEALTH CARE EDUCATION/TRAINING PROGRAM
Payer: MEDICARE

## 2023-01-01 VITALS
SYSTOLIC BLOOD PRESSURE: 68 MMHG | HEART RATE: 45 BPM | WEIGHT: 220.46 LBS | DIASTOLIC BLOOD PRESSURE: 32 MMHG | BODY MASS INDEX: 29.9 KG/M2

## 2023-01-01 DIAGNOSIS — I46.9 CARDIAC ARREST (HCC): Primary | ICD-10-CM

## 2023-01-01 PROCEDURE — 2500000003 HC RX 250 WO HCPCS: Performed by: STUDENT IN AN ORGANIZED HEALTH CARE EDUCATION/TRAINING PROGRAM

## 2023-01-01 PROCEDURE — 2500000003 HC RX 250 WO HCPCS

## 2023-01-01 PROCEDURE — 92950 HEART/LUNG RESUSCITATION CPR: CPT

## 2023-01-01 PROCEDURE — 99284 EMERGENCY DEPT VISIT MOD MDM: CPT

## 2023-01-01 PROCEDURE — 6360000002 HC RX W HCPCS: Performed by: STUDENT IN AN ORGANIZED HEALTH CARE EDUCATION/TRAINING PROGRAM

## 2023-01-01 PROCEDURE — 93005 ELECTROCARDIOGRAM TRACING: CPT | Performed by: STUDENT IN AN ORGANIZED HEALTH CARE EDUCATION/TRAINING PROGRAM

## 2023-01-01 PROCEDURE — 36556 INSERT NON-TUNNEL CV CATH: CPT

## 2023-01-01 PROCEDURE — 31500 INSERT EMERGENCY AIRWAY: CPT

## 2023-01-01 RX ORDER — NOREPINEPHRINE BIT/0.9 % NACL 16MG/250ML
1-100 INFUSION BOTTLE (ML) INTRAVENOUS CONTINUOUS
Status: DISCONTINUED | OUTPATIENT
Start: 2023-01-01 | End: 2023-01-01 | Stop reason: HOSPADM

## 2023-01-01 RX ORDER — EPINEPHRINE 0.1 MG/ML
0.3 SYRINGE (ML) INJECTION ONCE
Status: COMPLETED | OUTPATIENT
Start: 2023-01-01 | End: 2023-01-01

## 2023-01-01 RX ORDER — CALCIUM CHLORIDE 100 MG/ML
INJECTION INTRAVENOUS; INTRAVENTRICULAR DAILY PRN
Status: COMPLETED | OUTPATIENT
Start: 2023-01-01 | End: 2023-01-01

## 2023-01-01 RX ORDER — ESMOLOL HYDROCHLORIDE 10 MG/ML
50 INJECTION INTRAVENOUS ONCE
Status: COMPLETED | OUTPATIENT
Start: 2023-01-01 | End: 2023-01-01

## 2023-01-01 RX ORDER — EPINEPHRINE 0.1 MG/ML
0.1 SYRINGE (ML) INJECTION ONCE
Status: COMPLETED | OUTPATIENT
Start: 2023-01-01 | End: 2023-01-01

## 2023-01-01 RX ORDER — MAGNESIUM SULFATE HEPTAHYDRATE 500 MG/ML
INJECTION, SOLUTION INTRAMUSCULAR; INTRAVENOUS DAILY PRN
Status: COMPLETED | OUTPATIENT
Start: 2023-01-01 | End: 2023-01-01

## 2023-01-01 RX ORDER — NOREPINEPHRINE BIT/0.9 % NACL 16MG/250ML
INFUSION BOTTLE (ML) INTRAVENOUS
Status: COMPLETED
Start: 2023-01-01 | End: 2023-01-01

## 2023-01-01 RX ORDER — ESMOLOL HYDROCHLORIDE 10 MG/ML
50 INJECTION, SOLUTION INTRAVENOUS CONTINUOUS
Status: DISCONTINUED | OUTPATIENT
Start: 2023-01-01 | End: 2023-01-01 | Stop reason: HOSPADM

## 2023-01-01 RX ORDER — EPINEPHRINE 0.1 MG/ML
0.2 SYRINGE (ML) INJECTION ONCE
Status: COMPLETED | OUTPATIENT
Start: 2023-01-01 | End: 2023-01-01

## 2023-01-01 RX ORDER — AMIODARONE HYDROCHLORIDE 50 MG/ML
INJECTION, SOLUTION INTRAVENOUS DAILY PRN
Status: COMPLETED | OUTPATIENT
Start: 2023-01-01 | End: 2023-01-01

## 2023-01-01 RX ORDER — EPINEPHRINE 0.1 MG/ML
SYRINGE (ML) INJECTION DAILY PRN
Status: COMPLETED | OUTPATIENT
Start: 2023-01-01 | End: 2023-01-01

## 2023-01-01 RX ADMIN — MAGNESIUM SULFATE 2000 MG: 500 INJECTION, SOLUTION INTRAMUSCULAR; INTRAVENOUS at 12:22

## 2023-01-01 RX ADMIN — Medication 150 MG: at 12:05

## 2023-01-01 RX ADMIN — Medication 1 MG: at 12:07

## 2023-01-01 RX ADMIN — Medication 1 MG: at 12:18

## 2023-01-01 RX ADMIN — Medication 10 MCG/MIN: at 12:50

## 2023-01-01 RX ADMIN — Medication 1 MG: at 12:36

## 2023-01-01 RX ADMIN — CALCIUM CHLORIDE 1000 MG: 100 INJECTION, SOLUTION INTRAVENOUS at 12:08

## 2023-01-01 RX ADMIN — ESMOLOL HYDROCHLORIDE 50 MG: 10 INJECTION, SOLUTION INTRAVENOUS at 12:15

## 2023-01-01 RX ADMIN — Medication 1 MG: at 12:55

## 2023-01-01 RX ADMIN — Medication 1 MG: at 12:02

## 2023-01-01 RX ADMIN — SODIUM BICARBONATE 50 MEQ: 84 INJECTION, SOLUTION INTRAVENOUS at 12:38

## 2023-01-01 RX ADMIN — ESMOLOL HYDROCHLORIDE 50 MCG/KG/MIN: 10 INJECTION INTRAVENOUS at 12:18

## 2023-01-01 RX ADMIN — Medication 300 MG: at 12:08

## 2023-01-01 RX ADMIN — Medication 1 MG: at 12:41

## 2023-01-01 RX ADMIN — MAGNESIUM SULFATE 2000 MG: 500 INJECTION, SOLUTION INTRAMUSCULAR; INTRAVENOUS at 12:18

## 2023-01-01 RX ADMIN — SODIUM BICARBONATE 50 MEQ: 84 INJECTION, SOLUTION INTRAVENOUS at 12:20

## 2023-01-01 RX ADMIN — SODIUM BICARBONATE 50 MEQ: 84 INJECTION, SOLUTION INTRAVENOUS at 12:45

## 2023-01-01 RX ADMIN — SODIUM BICARBONATE 50 MEQ: 84 INJECTION, SOLUTION INTRAVENOUS at 12:03

## 2023-01-01 RX ADMIN — Medication 1 MG: at 12:22

## 2023-01-01 RX ADMIN — Medication 0.1 MG: at 12:28

## 2023-01-01 RX ADMIN — Medication 0.3 MG: at 12:44

## 2023-01-01 RX ADMIN — Medication 1 MG: at 12:32

## 2023-01-01 RX ADMIN — SODIUM BICARBONATE 50 MEQ: 84 INJECTION, SOLUTION INTRAVENOUS at 12:11

## 2023-01-01 RX ADMIN — SODIUM BICARBONATE 50 MEQ: 84 INJECTION, SOLUTION INTRAVENOUS at 12:49

## 2023-01-01 RX ADMIN — Medication 0.3 MG: at 12:46

## 2023-01-01 RX ADMIN — Medication 0.2 MG: at 12:30

## 2023-01-01 RX ADMIN — SODIUM BICARBONATE 50 MEQ: 84 INJECTION, SOLUTION INTRAVENOUS at 12:06

## 2023-01-01 RX ADMIN — Medication 1 MG: at 12:49

## 2023-01-01 RX ADMIN — Medication 1 MG: at 12:12

## 2023-01-18 DIAGNOSIS — E78.5 HYPERLIPIDEMIA, UNSPECIFIED HYPERLIPIDEMIA TYPE: ICD-10-CM

## 2023-01-19 RX ORDER — PRAVASTATIN SODIUM 80 MG/1
TABLET ORAL
Qty: 30 TABLET | Refills: 10 | Status: SHIPPED | OUTPATIENT
Start: 2023-01-19

## 2023-03-17 NOTE — ED PROVIDER NOTES
[facility],[address] DEPT  EMERGENCY DEPARTMENT ENCOUNTER      Pt Name: Daniel Marquez  MRN: [id number]  [place] 1960  Date of evaluation: 3/17/2023  Resident Physician: Jayashree Raygoza MD  Supervising Physician: DO Dr. Stephanie Saucedo DO  CHIEF COMPLAINT     No chief complaint on file. HISTORY OF PRESENT ILLNESS    HPI  Daniel Marquez is a 70 y.o. male with past medical history of nonocclusive CAD, morbid obesity, hypertension, uncontrolled diabetes who presents to the emergency department for evaluation of code    Per EMS and family, patient just finished eating lunch and was going to take a shower. Patient's son heard a thud in the shower and went into check on him. Patient was found down. EMS was called. Per family, patient was still breathing when they called EMS. However on EMS arrival, patient was unresponsive and CPR was initiated. EMS inserted a Santiago airway, PEA on the monitor and patient was given epi x3. Pt coded for approx 20 mins PTA. On ED arrival, patient was actively coding. Point care glucose was in the 200s.     PAST MEDICAL AND SURGICAL HISTORY     Past Medical History:   Diagnosis Date    Diabetes mellitus ([address]     ED (erectile dysfunction)     Hyperlipidemia     Hypertension     Type 2 diabetes mellitus without complication ([address]     Type II or unspecified type diabetes mellitus without mention of complication, not stated as uncontrolled      Past Surgical History:   Procedure Laterality Date    CARDIAC CATHETERIZATION      KNEE SURGERY Right 12/2017    VARICOSE VEIN SURGERY         MEDICATIONS     Current Facility-Administered Medications:     calcium chloride 10 % injection, , IntraVENous, Daily PRN, Kellen Castellanos MD, 1,000 mg at 03/17/23 1208    sodium bicarbonate 8.4 % injection, , IntraVENous, Daily PRN, Kellen Castellanos MD, 50 mEq at 03/17/23 1249    amiodarone (CORDARONE) injection, , IntraVENous, Daily PRN, Kellen Castellanos MD, 300 mg at 03/17/23 1208    EPINEPHrine 1 MG/10ML injection, , IntraVENous, Daily PRN, Zulema Talley MD, 1 mg at 03/17/23 1255    magnesium sulfate injection, , , Daily PRN, Zulema Talley MD, 2,000 mg at 03/17/23 1222    EPINEPHrine 5 mg in sodium chloride 0.9 % 250 ml infusion, 1-20 mcg/min, IntraVENous, Continuous, Concha Madrigal,     norepinephrine-sodium chloride (LEVOPHED) 16-0.9 MG/250ML-% infusion, , , ,     Current Outpatient Medications:     pravastatin (PRAVACHOL) 80 MG tablet, TAKE 1 TABLET BY MOUTH ONCE DAILY, Disp: 30 tablet, Rfl: 10    valsartan-hydroCHLOROthiazide (DIOVAN-HCT) 320-12.5 MG per tablet, TAKE 1 TABLET BY MOUTH EVERY DAY, Disp: 30 tablet, Rfl: 10    metoprolol tartrate (LOPRESSOR) 50 MG tablet, TAKE ONE (1) TABLET BY MOUTH TWICE DAILY (Patient not taking: Reported on 10/27/2022), Disp: 60 tablet, Rfl: 10    amLODIPine (NORVASC) 10 MG tablet, TAKE 1 TABLET BY MOUTH EVERY DAY, Disp: 30 tablet, Rfl: 10    DULoxetine (CYMBALTA) 30 MG extended release capsule, TAKE 1 CAPSULE BY MOUTH DAILY, Disp: 30 capsule, Rfl: 10    glipiZIDE (GLUCOTROL XL) 10 MG extended release tablet, TAKE 1 TABLET BY MOUTH DAILY, Disp: 30 tablet, Rfl: 10    metFORMIN (GLUCOPHAGE) 1000 MG tablet, TAKE ONE (1) TABLET BY MOUTH TWICE DAILY WITH FOOD, Disp: 60 tablet, Rfl: 10    JANUVIA 100 MG tablet, TAKE 1 TABLET BY MOUTH EVERY MORNING BEFORE BREAKFAST, Disp: 30 tablet, Rfl: 11    Omega-3 Fatty Acids (FISH OIL PO), Take by mouth, Disp: , Rfl:     empagliflozin (JARDIANCE) 25 MG tablet, Take 1 tablet by mouth daily, Disp: 90 tablet, Rfl: 3    albuterol sulfate HFA (VENTOLIN HFA) 108 (90 Base) MCG/ACT inhaler, Inhale 2 puffs into the lungs every 4 hours as needed for Wheezing or Shortness of Breath, Disp: 1 Inhaler, Rfl: 5    ibuprofen (ADVIL;MOTRIN) 600 MG tablet, Take 1 tablet by mouth every 8 hours as needed for Pain, Disp: 60 tablet, Rfl: 0    diclofenac sodium (VOLTAREN) 1 % GEL, Apply 2 g topically 4 times daily (Patient not taking: Reported on 10/27/2022), Disp: 5 Tube, Rfl: 0    Insulin Pen Needle 31G X 5 MM MISC, Use weekly with Trulicity (Patient not taking: Reported on 10/27/2022), Disp: 100 each, Rfl: 0    aspirin 81 MG EC tablet, Take 1 tablet by mouth daily, Disp: 90 tablet, Rfl: 3    Multiple Vitamins-Minerals (CENTRUM PO), Take 1 tablet by mouth daily. , Disp: , Rfl:     Previous Medications    ALBUTEROL SULFATE HFA (VENTOLIN HFA) 108 (90 BASE) MCG/ACT INHALER    Inhale 2 puffs into the lungs every 4 hours as needed for Wheezing or Shortness of Breath    AMLODIPINE (NORVASC) 10 MG TABLET    TAKE 1 TABLET BY MOUTH EVERY DAY    ASPIRIN 81 MG EC TABLET    Take 1 tablet by mouth daily    DICLOFENAC SODIUM (VOLTAREN) 1 % GEL    Apply 2 g topically 4 times daily    DULOXETINE (CYMBALTA) 30 MG EXTENDED RELEASE CAPSULE    TAKE 1 CAPSULE BY MOUTH DAILY    EMPAGLIFLOZIN (JARDIANCE) 25 MG TABLET    Take 1 tablet by mouth daily    GLIPIZIDE (GLUCOTROL XL) 10 MG EXTENDED RELEASE TABLET    TAKE 1 TABLET BY MOUTH DAILY    IBUPROFEN (ADVIL;MOTRIN) 600 MG TABLET    Take 1 tablet by mouth every 8 hours as needed for Pain    INSULIN PEN NEEDLE 31G X 5 MM MISC    Use weekly with Emeka    JANUVIA 385 MG TABLET    TAKE 1 TABLET BY MOUTH EVERY MORNING BEFORE BREAKFAST    METFORMIN (GLUCOPHAGE) 1000 MG TABLET    TAKE ONE (1) TABLET BY MOUTH TWICE DAILY WITH FOOD    METOPROLOL TARTRATE (LOPRESSOR) 50 MG TABLET    TAKE ONE (1) TABLET BY MOUTH TWICE DAILY    MULTIPLE VITAMINS-MINERALS (CENTRUM PO)    Take 1 tablet by mouth daily.       OMEGA-3 FATTY ACIDS (FISH OIL PO)    Take by mouth    PRAVASTATIN (PRAVACHOL) 80 MG TABLET    TAKE 1 TABLET BY MOUTH ONCE DAILY    VALSARTAN-HYDROCHLOROTHIAZIDE (DIOVAN-HCT) 320-12.5 MG PER TABLET    TAKE 1 TABLET BY MOUTH EVERY DAY       SOCIAL HISTORY     Social History     Social History Narrative    Not on file     Social History     Tobacco Use    Smoking status: Former     Packs/day: 2.00     Years: 10.00     Pack years: 20.00     Types: Cigarettes     Quit date: 9/22/2012     Years since quitting: 10.4    Smokeless tobacco: Never   Vaping Use    Vaping Use: Never used   Substance Use Topics    Alcohol use: Yes     Alcohol/week: 1.0 standard drink     Types: 1 Cans of beer per week     Comment: RARE    Drug use: No       ALLERGIES     Allergies   Allergen Reactions    Latex Rash    Other Rash     Band-Aids         FAMILY HISTORY     Family History   Problem Relation Age of Onset    Heart Disease Mother     Cancer Father        PHYSICAL EXAM     ED Triage Vitals [03/17/23 1228]   BP Temp Temp src Heart Rate Resp SpO2 Height Weight   98/68 -- -- 55 -- -- -- --     Initial vital signs and nursing assessment reviewed and abnormal from no pulse . There is no height or weight on file to calculate BMI. Additional Vital Signs:  Vitals:    03/17/23 1247   BP: (!) 68/32   Pulse: (!) 45       Physical Exam  Constitutional:       General: He is in acute distress. Appearance: He is obese. He is ill-appearing and toxic-appearing. HENT:      Head: Normocephalic and atraumatic. Pulmonary:      Comments: B/l breath sounds after intubation  Musculoskeletal:      Cervical back: Normal range of motion and neck supple. Skin:     Coloration: Skin is pale. Comments: Mottled skin, cool to touch. pale     ED RESULTS   Laboratory results (none if blank):  Labs Reviewed - No data to display  All laboratory results are individually reviewed and interpreted by me. See ED course below for results interpretation if applicable. (Any cultures that may have been sent were not resulted at the time of this patient ED visit)    Radiologic studies results available at the moment of this note (None if blank): No orders to display     See ED course below for my interpretation if applicable.   All radiology images independently reviewed by me in addition to interpretation provided by the radiologist.    EKG interpretation (none if blank): Not applicable  All EKG results are individually reviewed and interpreted by me. All EKGs are also interpreted by our Cardiology department, final interpretation may not be available as of the writing of this note. INITIAL ASSESSMENT   Comorbid conditions pertinent to this ED encounter:  Nonocclusive CAD, uncontrolled DM. HTN    Differential Diagnosis includes but is not limited to:  Cardiac arrest 2/2 PE, tamponade, tension ptx, CAD, hypo/hyperK, electrolyte abnormalities, hypoglycemia, hypothermia    Although some of these diagnoses are unlikely, they were consider in my medical decision making. Decision Rules/Clinical Scores utilized:  Not Applicable. Code Status:  Full code: Discussed with patient/family, at this moment they elect Full Code    Social determinants of health impacting treatment or disposition:  Not Applicable. PREVIOUS RECORDS  AND EXTERNAL INFORMATION REVIEWED   History obtained from: EMS and family. Pertinent previous and/or external records reviewed: Noncontributory.     Case discussed with specialties other than Emergency Medicine: Not Applicable    ED COURSE   ED Medications administered this visit (None if left blank):   Medications   calcium chloride 10 % injection (1,000 mg IntraVENous Given 3/17/23 1208)   sodium bicarbonate 8.4 % injection (50 mEq IntraVENous Given 3/17/23 1249)   amiodarone (CORDARONE) injection (300 mg IntraVENous Given 3/17/23 1208)   EPINEPHrine 1 MG/10ML injection (1 mg IntraVENous Given 3/17/23 1255)   magnesium sulfate injection (2,000 mg IntraVENous Given 3/17/23 1222)   EPINEPHrine 5 mg in sodium chloride 0.9 % 250 ml infusion (has no administration in time range)   norepinephrine-sodium chloride (LEVOPHED) 16-0.9 MG/250ML-% infusion (has no administration in time range)       ED Course as of 03/17/23 1439   Fri Mar 17, 2023   1326  called and pcp Dr Nika Au to sign death certificate [EL]      ED Course User Index  [EL] Cherry Ocasio Georgia Diane MD       CRITICAL CARE:  Critical Care Diagnosis. cardiac arrest Requiring frequent monitoring and reassessment during ED course    PROCEDURES: (None if blank)  Procedures:     MEDICATION CHANGES     New Prescriptions    No medications on file       FINAL DISPOSITION and [address] [place] [address] Dr Lorenzo Cash  70 M came in as code from EMS. Coded for 20 mins with ems, pea on monitor. 3 rounds of epi. On arrival, pt was actively coding. Pt arrived with a clifton airway and was intubated by Dr. Reese Bain. Separate procedure note in chart. Pt continued to be PEA in the ED and then switched to Vfib. Pt was shocked at 360J a couple times before switching to dual sequence defibrillation. Initial dual shock was at 360 and 200J, subsequent shocks were both at 360J. At approx the 45 min down time yocasta, I went to discuss with family the poor prognosis. I brought the family back to the room and at that time, attending physician had gotten ROSC. At that time, Crash fem line was placed by Dr. Oral Petit, please see separate procedure note. Pt had ROSC momentarily before going back into PEA arrest. Code was restarted. At approx the 1 hour downtime yocasta, family was brought into the room for final round. Pt then got ROSC on pulse check. He became hypotensive and levophed was started. Pt then went into [facility] where we started compression and additional shocks with single 360J were given in conjunction with epi. At approx 1 hour and 20 mins down time yocasta, there was PEA on monitor, POCUS showing no cardiac activity. TOD was called at 1259. In total:  Pt received 11 rounds of epi in the ED (+3 in the EMS for a total of 14 epi)  7 amps of bicarb  300 + 150 amiodarone  1g calcium   4g mag   50mg esmolol injection and 50mcg/kg/min gtt  Levophed at 10 gtt    Call made to PCP and     Shared Decision-Making was performed, disposition discussed with the patient/family and questions answered.     Outpatient follow up (If applicable):  No follow-up provider specified. Not applicable    FINAL DIAGNOSES:  Final diagnoses:   Cardiac arrest [facility])     Condition: condition:   Dispo:   DISPOSITION  2023 01:12:31 PM    This transcription was electronically signed. It was dictated by use of voice recognition software and electronically transcribed. The transcription may contain errors not detected in proofreading.        Adin More MD  Resident  [phone]

## 2023-03-17 NOTE — CODE DOCUMENTATION
Pt arrives to ER in Full cardiac arrest. EMS was called to pt residence by wife after he had fallen in the shower. Upon arrival, pt was found to be not breathing and no pulse. CPR was initiated. Santiago airway was placed and pt received 3 rounds of epi and 1 sodium bicarb by ems prior to arrival. Last EPI given by ems at 1159. EMS report pt had been down for approx. 20 minutes prior to them arriving to the ER. Family reports pt has a cardiac history, but denies patient being sick recently or having any concerns.

## 2023-03-17 NOTE — PROGRESS NOTES
Called to Ed to be with ramesh's family,during a code blue. His wife, sister and nice are present as are his two sons with another son on the way.    03/17/23 1205   Encounter Summary   Encounter Overview/Reason  Spiritual/Emotional Needs   Service Provided For: Family   Referral/Consult From: Nursing Supervisor/Manager;Multi-disciplinary team   Support System Spouse; Family members; Children  (sisters  and nieces)   Last Encounter  03/17/23   Complexity of Encounter High   Begin Time 1210   End Time  1350   Total Time Calculated 100 min   Encounter    Type Family Care   Crisis   Type Code Blue   Grief, Loss, and Adjustments   Type Grief and loss;Death;Bereavement Care   Code ended in a death. Family care given with words of encouragement, prayers and comfort. Hospitality also shown.

## 2023-03-17 NOTE — CODE DOCUMENTATION
No cardiac activity noted on via ultrasound by Dr. Thang Brewster and Dr. Luann Wakefield. KATHIE called at 1259.

## 2023-03-17 NOTE — ED PROVIDER NOTES
Central Line    Date/Time: 3/17/2023 1:43 PM  Performed by: William Maxwell DO  Authorized by: William Maxwell DO     Consent:     Consent obtained:  Emergent situation  Pre-procedure details:     Indication(s): central venous access      Hand hygiene: Hand hygiene performed prior to insertion      Sterile barrier technique:  All elements of maximal sterile technique followed      Skin preparation:  Chlorhexidine    Skin preparation agent: Skin preparation agent completely dried prior to procedure    Sedation:     Sedation type:  None  Anesthesia:     Anesthesia method:  None  Procedure details:     Location:  R femoral    Patient position:  Supine    Procedural supplies:  Triple lumen    Catheter size:  7 Fr    Landmarks identified: yes      Ultrasound guidance: no      Number of attempts:  1    Successful placement: yes    Post-procedure details:     Post-procedure:  Dressing applied and line sutured    Assessment:  Blood return through all ports    Procedure completion:  [facility]  03/17/23 7919

## 2023-03-17 NOTE — ED PROVIDER NOTES
Intubation    Date/Time: 3/17/2023 1:36 PM  Performed by: Emiliano Shin MD  Authorized by: Shahid Harris DO     Consent:     Consent obtained:  Emergent situation  Pre-procedure details:     Indication: failure to oxygenate, failure to protect airway and failure to ventilate      Patient status:  Unresponsive    Look externally: facial hair and large tongue      Mouth opening - incisor distance:  3 or more finger widths    Hyoid-mental distance: 3 or more finger widths      Hyoid-thyroid distance: 2 or more finger widths      Mallampati score:  IV    Obstruction: none      Neck mobility: reduced      Pharmacologic strategy: none      Induction agents:  None    Paralytics:  None  Procedure details:     Preoxygenation:  Supraglottic device    CPR in progress: yes      Intubation method:  Oral    Intubation technique: video assisted      Laryngoscope blade:   Mac 3    Bougie used: no      Grade view: II      Tube size (mm):  7.5    Tube type:  Cuffed    Number of attempts:  1    Ventilation between attempts: no      Tube visualized through cords: yes    Placement assessment:     ETT at teeth/gumline (cm):  23    Tube secured with:  ETT samson    Breath sounds:  Equal and absent over the epigastrium    Placement verification: chest rise, colorimetric ETCO2, direct visualization, equal breath sounds, numeric ETCO2 and tube exhalation        Electronically signed by Emiliano Shin MD on 3/17/2023 at 1:38 PM       Emiliano Shin MD  Resident  03/17/23 7300

## 2023-03-17 NOTE — PROGRESS NOTES
Respiratory care participated in Code. End tidal CO2 monitor , was 38. For further information see Code sheet.

## 2023-03-18 LAB
EKG Q-T INTERVAL: 536 MS
EKG QRS DURATION: 202 MS
EKG QTC CALCULATION (BAZETT): 494 MS
EKG R AXIS: -78 DEGREES
EKG T AXIS: 51 DEGREES
EKG VENTRICULAR RATE: 51 BPM

## 2023-03-18 PROCEDURE — 93010 ELECTROCARDIOGRAM REPORT: CPT | Performed by: INTERNAL MEDICINE

## 2023-03-21 ENCOUNTER — TELEPHONE (OUTPATIENT)
Dept: FAMILY MEDICINE CLINIC | Age: 63
End: 2023-03-21

## 2023-03-21 NOTE — TELEPHONE ENCOUNTER
Thania with [facility] called office. She is emailing you pt's death certificated. Please email back to her once complete.